# Patient Record
Sex: FEMALE | Race: WHITE | Employment: OTHER | URBAN - METROPOLITAN AREA
[De-identification: names, ages, dates, MRNs, and addresses within clinical notes are randomized per-mention and may not be internally consistent; named-entity substitution may affect disease eponyms.]

---

## 2017-06-29 ENCOUNTER — RADIANT APPOINTMENT (OUTPATIENT)
Dept: GENERAL RADIOLOGY | Facility: CLINIC | Age: 47
End: 2017-06-29
Attending: FAMILY MEDICINE
Payer: COMMERCIAL

## 2017-06-29 ENCOUNTER — OFFICE VISIT (OUTPATIENT)
Dept: FAMILY MEDICINE | Facility: CLINIC | Age: 47
End: 2017-06-29
Payer: COMMERCIAL

## 2017-06-29 VITALS
HEIGHT: 67 IN | WEIGHT: 203.4 LBS | HEART RATE: 68 BPM | DIASTOLIC BLOOD PRESSURE: 78 MMHG | TEMPERATURE: 97 F | BODY MASS INDEX: 31.92 KG/M2 | SYSTOLIC BLOOD PRESSURE: 118 MMHG

## 2017-06-29 DIAGNOSIS — R42 LIGHTHEADEDNESS: ICD-10-CM

## 2017-06-29 DIAGNOSIS — R07.9 CHEST PAIN, UNSPECIFIED TYPE: ICD-10-CM

## 2017-06-29 DIAGNOSIS — R07.9 CHEST PAIN, UNSPECIFIED TYPE: Primary | ICD-10-CM

## 2017-06-29 DIAGNOSIS — H81.10 BPPV (BENIGN PAROXYSMAL POSITIONAL VERTIGO), UNSPECIFIED LATERALITY: ICD-10-CM

## 2017-06-29 LAB
ALBUMIN SERPL-MCNC: 3.7 G/DL (ref 3.4–5)
ALP SERPL-CCNC: 73 U/L (ref 40–150)
ALT SERPL W P-5'-P-CCNC: 29 U/L (ref 0–50)
ANION GAP SERPL CALCULATED.3IONS-SCNC: 4 MMOL/L (ref 3–14)
AST SERPL W P-5'-P-CCNC: 23 U/L (ref 0–45)
BASOPHILS # BLD AUTO: 0 10E9/L (ref 0–0.2)
BASOPHILS NFR BLD AUTO: 0.2 %
BILIRUB SERPL-MCNC: 0.3 MG/DL (ref 0.2–1.3)
BUN SERPL-MCNC: 12 MG/DL (ref 7–30)
CALCIUM SERPL-MCNC: 8.9 MG/DL (ref 8.5–10.1)
CHLORIDE SERPL-SCNC: 104 MMOL/L (ref 94–109)
CO2 SERPL-SCNC: 30 MMOL/L (ref 20–32)
CREAT SERPL-MCNC: 0.88 MG/DL (ref 0.52–1.04)
DIFFERENTIAL METHOD BLD: ABNORMAL
EOSINOPHIL # BLD AUTO: 0.1 10E9/L (ref 0–0.7)
EOSINOPHIL NFR BLD AUTO: 0.7 %
ERYTHROCYTE [DISTWIDTH] IN BLOOD BY AUTOMATED COUNT: 12.9 % (ref 10–15)
GFR SERPL CREATININE-BSD FRML MDRD: 69 ML/MIN/1.7M2
GLUCOSE SERPL-MCNC: 87 MG/DL (ref 70–99)
HCT VFR BLD AUTO: 41.8 % (ref 35–47)
HGB BLD-MCNC: 13.7 G/DL (ref 11.7–15.7)
LYMPHOCYTES # BLD AUTO: 3.4 10E9/L (ref 0.8–5.3)
LYMPHOCYTES NFR BLD AUTO: 30 %
MCH RBC QN AUTO: 31.3 PG (ref 26.5–33)
MCHC RBC AUTO-ENTMCNC: 32.8 G/DL (ref 31.5–36.5)
MCV RBC AUTO: 95 FL (ref 78–100)
MONOCYTES # BLD AUTO: 0.8 10E9/L (ref 0–1.3)
MONOCYTES NFR BLD AUTO: 6.9 %
NEUTROPHILS # BLD AUTO: 7 10E9/L (ref 1.6–8.3)
NEUTROPHILS NFR BLD AUTO: 62.2 %
PLATELET # BLD AUTO: 257 10E9/L (ref 150–450)
POTASSIUM SERPL-SCNC: 3.6 MMOL/L (ref 3.4–5.3)
PROT SERPL-MCNC: 7 G/DL (ref 6.8–8.8)
RBC # BLD AUTO: 4.38 10E12/L (ref 3.8–5.2)
SODIUM SERPL-SCNC: 138 MMOL/L (ref 133–144)
WBC # BLD AUTO: 11.2 10E9/L (ref 4–11)

## 2017-06-29 PROCEDURE — 99214 OFFICE O/P EST MOD 30 MIN: CPT | Performed by: FAMILY MEDICINE

## 2017-06-29 PROCEDURE — 93000 ELECTROCARDIOGRAM COMPLETE: CPT | Performed by: FAMILY MEDICINE

## 2017-06-29 PROCEDURE — 85025 COMPLETE CBC W/AUTO DIFF WBC: CPT | Performed by: FAMILY MEDICINE

## 2017-06-29 PROCEDURE — 36415 COLL VENOUS BLD VENIPUNCTURE: CPT | Performed by: FAMILY MEDICINE

## 2017-06-29 PROCEDURE — 71020 XR CHEST 2 VW: CPT

## 2017-06-29 PROCEDURE — 80053 COMPREHEN METABOLIC PANEL: CPT | Performed by: FAMILY MEDICINE

## 2017-06-29 NOTE — PATIENT INSTRUCTIONS
I suspect the lightheadedness is residual form the vertigo and should be improving over the next week or so.    Your EKG was normal today as was your chest x-ray.  There was no sign of anemia on your blood count.  I will let you know the electrolyte results when available.    I would recommend a basic exercise stress test to help with risk factor stratification.  A normal result would be very reassuring.  You can call [phone] to schedule.    If you develop severe chest pain or pressure please make sure to seek emergent care.

## 2017-06-29 NOTE — NURSING NOTE
"Initial /78  Pulse 68  Temp 97  F (36.1  C) (Tympanic)  Ht 5' 7.25\" (1.708 m)  Wt 203 lb 6.4 oz (92.3 kg)  Breastfeeding? No  BMI 31.62 kg/m2 Estimated body mass index is 31.62 kg/(m^2) as calculated from the following:    Height as of this encounter: 5' 7.25\" (1.708 m).    Weight as of this encounter: 203 lb 6.4 oz (92.3 kg). .      "

## 2017-06-29 NOTE — MR AVS SNAPSHOT
After Visit Summary   6/29/2017    Mariano Dey    MRN: [id number]           Patient Information     Date Of Birth          1970        Visit Information        Provider Department      6/29/2017 3:20 PM Shilpa Guerra DO [facility] [address]        Today's Diagnoses     Chest pain, unspecified type    -  1    Lightheadedness          Care Instructions    I suspect the lightheadedness is residual form the vertigo and should be improving over the next week or so.    Your EKG was normal today as was your chest x-ray.  There was no sign of anemia on your blood count.  I will let you know the electrolyte results when available.    I would recommend a basic exercise stress test to help with risk factor stratification.  A normal result would be very reassuring.  You can call [phone] to schedule.    If you develop severe chest pain or pressure please make sure to seek emergent care.              Follow-ups after your visit        Future tests that were ordered for you today     Open Future Orders        Priority Expected Expires Ordered    Exercise Stress test Routine  8/13/2017 6/29/2017            Who to contact     Normal or non-critical lab and imaging results will be communicated to you by [employer], letter or phone within 4 business days after the clinic has received the results. If you do not hear from us within 7 days, please contact the clinic through [employer] or phone. If you have a critical or abnormal lab result, we will notify you by phone as soon as possible.  Submit refill requests through [email or web address] or call your pharmacy and they will forward the refill request to us. Please allow 3 business days for your refill to be completed.          If you need to speak with a  for additional information , please call: [phone]           Additional Information About Your Visit        [employer] Information     [email or web address] gives you secure access to your electronic health  "record. If you see a primary care provider, you can also send messages to your care team and make appointments. If you have questions, please call your primary care clinic.  If you do not have a primary care provider, please call [phone] and they will assist you.        Care EveryWhere ID     This is your Care EveryWhere ID. This could be used by other organizations to access your [place] medical records  [id number]        Your Vitals Were     Pulse Temperature Height Breastfeeding? BMI (Body Mass Index)       68 97  F (36.1  C) (Tympanic) 5' 7.25\" (1.708 m) No 31.62 kg/m2        Blood Pressure from Last 3 Encounters:   06/29/17 118/78   07/24/15 120/74   10/29/13 122/84    Weight from Last 3 Encounters:   06/29/17 203 lb 6.4 oz (92.3 kg)   08/21/15 209 lb (94.8 kg)   07/31/15 209 lb (94.8 kg)              We Performed the Following     CBC with platelets and differential     Comprehensive metabolic panel     EKG 12-lead complete w/read - Clinics          Today's Medication Changes          These changes are accurate as of: 6/29/17  4:30 PM.  If you have any questions, ask your nurse or doctor.               Stop taking these medicines if you haven't already. Please contact your care team if you have questions.     WOMENS MULTI VITAMIN & MINERAL PO   Stopped by:  Shilpa Guerra DO                    Primary Care Provider Office Phone # Fax #    Shilpa Guerra [phone]       Eads PRAKASH ZARATE [address] [place] [address] DR PRAKASH ZARATE [place] [address]        Equal Access to Services     [facility] campusSHERI : Hadii aad ku hadasho Soomaali, waaxda luqadaha, qaybta kaalmada rose mary draper. So [facility] [phone].    ATENCIÓN: Si habla español, tiene a donovan disposición servicios gratuitos de asistencia lingüística. Llame al [phone].    We comply with applicable federal civil rights laws and [place] laws. We do not discriminate on the basis of race, color, national origin, " age, disability sex, sexual orientation or gender identity.            Thank you!     Thank you for choosing [facility]  for your care. Our goal is always to provide you with excellent care. Hearing back from our patients is one way we can continue to improve our services. Please take a few minutes to complete the written survey that you may receive in the mail after your visit with us. Thank you!             Your Updated Medication List - Protect others around you: Learn how to safely use, store and throw away your medicines at www.disposemymeds.org.          This list is accurate as of: 6/29/17  4:30 PM.  Always use your most recent med list.                   Brand Name Dispense Instructions for use Diagnosis    IBUPROFEN PO      Take 400 mg by mouth as needed.

## 2017-06-29 NOTE — PROGRESS NOTES
"  SUBJECTIVE:                                                    Mariano Dey is a 46 year old female who presents to clinic today for the following health issues:    Dizziness  Onset: 2 months    Description:   Do you feel faint:  YES  Does it feel like the surroundings (bed, room) are moving: YES  Unsteady/off balance: YES  Have you passed out or fallen: no, has almost fallen    Intensity: mild    Progression of Symptoms:  improving and constant    Accompanying Signs & Symptoms:  Heart palpitations: YES  Nausea, vomiting: YES  Weakness in arms or legs: no   Fatigue: YES  Vision or speech changes: no   Ringing in ears (Tinnitus): no   Hearing Loss: no     History:   Head trauma/concussion hx: no   Previous similar symptoms: YES- in her early 20's  Recent bleeding history: no     Precipitating factors:   Worse with activity or head movement: YES  Any new medications (BP?): no   Alcohol/drug abuse/withdrawal: YES- alcohol 2-3 beers daily    Alleviating factors:   Does staying in a fixed position give relief:  YES    Therapies Tried and outcome: Eply maneuver     Symptoms started 6 months ago.  Began with positional vertigo.  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  Was able to pull over and vision cleared in 5 minutes.  Earlier that AM she had some mild chest pressure and \"pinching in the L arm.  No additional episodes of tunnel vision, syncope or presyncope noted.    Feels a couple of times per day her chest feels \"heavy\" which seems to extend towards her neck and towards her L arm.  Sems to last maybe a couple of mintues.  No assoicated SOB or nausea.  Ongoing for about 1 week or so.  Seems to becoming less frequent and less noticeable over time.  Also feels \"off\" mildly lightheaded when she moves her head quickly.  Feels like she has some residual vertigo with head movement.        Pinching sensation is not assosicated with exercise or exertion.  " Feels more tired overall but waking frequently with night sweat issues as well in her perimenopause.  LMP 3-4 months ago, periods overall have bene becoming lighter and further apart over time.  Pt is not sexually active  Has been exercising quite a bit more recently including lifting weights and wondering if this soreness is related to her lifting      Problem list and histories reviewed & adjusted, as indicated.  Additional history: as documented    Reviewed and updated as needed this visit by clinical staff  Tobacco  Allergies  Meds  Med Hx  Surg Hx  Fam Hx  Soc Hx      Reviewed and updated as needed this visit by Provider  Tobacco  Med Hx  Surg Hx  Fam Hx  Soc Hx      ROS: Remainder of Constitutional, CV, Respiratory, GI,  negative with exception of that mentioned above    PE:  VS as above   Gen:  WN/WD/WH female in NAD   HEENT:  NC/AT, conjunctiva wnl, TM's wnl efe pearly gray   with good light reflex, oropharynx clear without    exudate/erythema   Neck:  supple, no LAD appreciated   Heart:  RRR without murmur, nl S1, S2, no rubs or gallops   Lungs CTA efe without rales/ronchi/wheezes   Ext:  No pedal edema    EKG:  NSR with no evidence of ischemia  CXR:  wnl with no acute findings per my visualization    CBC wnl with mild leukocytosis    A/P:      ICD-10-CM    1. Chest pain, unspecified type R07.9 EKG 12-lead complete w/read - Clinics     XR Chest 2 Views     CBC with platelets and differential     Comprehensive metabolic panel     Exercise Stress test   2. Lightheadedness R42 CBC with platelets and differential     Comprehensive metabolic panel   3. BPPV (benign paroxysmal positional vertigo), unspecified laterality H81.10      Patient Instructions   I suspect the lightheadedness is residual form the vertigo and should be improving over the next week or so.    Your EKG was normal today as was your chest x-ray.  There was no sign of anemia on your blood count.  I will let you know the electrolyte  results when available.    I would recommend a basic exercise stress test to help with risk factor stratification.  A normal result would be very reassuring.  You can call [phone] to schedule.    If you develop severe chest pain or pressure please make sure to seek emergent care.

## 2018-08-26 ENCOUNTER — TRANSFERRED RECORDS (OUTPATIENT)
Dept: HEALTH INFORMATION MANAGEMENT | Facility: CLINIC | Age: 48
End: 2018-08-26

## 2018-09-24 ENCOUNTER — HEALTH MAINTENANCE LETTER (OUTPATIENT)
Age: 48
End: 2018-09-24

## 2019-10-24 ENCOUNTER — MYC MEDICAL ADVICE (OUTPATIENT)
Dept: FAMILY MEDICINE | Facility: CLINIC | Age: 49
End: 2019-10-24

## 2019-11-12 ENCOUNTER — OFFICE VISIT (OUTPATIENT)
Dept: FAMILY MEDICINE | Facility: CLINIC | Age: 49
End: 2019-11-12
Payer: COMMERCIAL

## 2019-11-12 VITALS
DIASTOLIC BLOOD PRESSURE: 66 MMHG | BODY MASS INDEX: 32.83 KG/M2 | HEART RATE: 80 BPM | HEIGHT: 67 IN | WEIGHT: 209.2 LBS | SYSTOLIC BLOOD PRESSURE: 124 MMHG | RESPIRATION RATE: 12 BRPM | TEMPERATURE: 97 F

## 2019-11-12 DIAGNOSIS — Z72.0 TOBACCO ABUSE: ICD-10-CM

## 2019-11-12 DIAGNOSIS — Z12.11 SPECIAL SCREENING FOR MALIGNANT NEOPLASMS, COLON: ICD-10-CM

## 2019-11-12 DIAGNOSIS — R22.31 MASS OF RIGHT AXILLA: Primary | ICD-10-CM

## 2019-11-12 DIAGNOSIS — Z12.31 ENCOUNTER FOR SCREENING MAMMOGRAM FOR BREAST CANCER: ICD-10-CM

## 2019-11-12 PROCEDURE — 99213 OFFICE O/P EST LOW 20 MIN: CPT | Performed by: FAMILY MEDICINE

## 2019-11-12 ASSESSMENT — MIFFLIN-ST. JEOR: SCORE: 1610.51

## 2019-11-12 NOTE — NURSING NOTE
"Initial /66   Pulse 80   Temp 97  F (36.1  C) (Tympanic)   Resp 12   Ht 1.708 m (5' 7.25\")   Wt 94.9 kg (209 lb 3.2 oz)   Breastfeeding? No   BMI 32.52 kg/m   Estimated body mass index is 32.52 kg/m  as calculated from the following:    Height as of this encounter: 1.708 m (5' 7.25\").    Weight as of this encounter: 94.9 kg (209 lb 3.2 oz). .      "

## 2019-11-12 NOTE — PATIENT INSTRUCTIONS
Please call [phone] to schedule your mammogram    [facility] online for an order form.  Complete your information and send it to me to sign.  You should get your colon cancer screening test in the mail.  I will let you know results when I see them    We'll try the Chantix again.  If you have issues with nausea again we can discuss other options at your physical.  Please plan on fasting for that visit.  You will need to be fasting for 12 hours (nothing but water) prior to your blood work.

## 2019-11-12 NOTE — PROGRESS NOTES
"Subjective     Mariano Dey is a 48 year old female who presents to clinic today for the following health issues:    HPI     * lump right axilla, noticed 2 weeks ago    Incidentally noticed a small nontender superficial lump in the R axilla 2 weeks ago.  When she initially came for her appointment had already resolved.  Has not been able to feel it since.  No other breast concerns.  No lumps or masses, no skin changes, no nipple discharge    Thought she would keep appt as she knows she is due for mammogram screening at 49.    *  Interested in tob cessation.  Generally smokes 1ppd and has for many years.  Tried chantix years ago.  Did have some nausea but otherwise tolerated it well.  Would like to give it another shot.  Knows that tob cessation is the only way to decrease her lung cancer risk      Reviewed and updated as needed this visit by Provider  Tobacco  Allergies  Meds  Med Hx  Surg Hx  Fam Hx  Soc Hx        Review of Systems   ROS COMP: Constitutional, HEENT, cardiovascular, pulmonary, gi and gu systems are negative, except as otherwise noted.      Objective    /66   Pulse 80   Temp 97  F (36.1  C) (Tympanic)   Resp 12   Ht 1.708 m (5' 7.25\")   Wt 94.9 kg (209 lb 3.2 oz)   Breastfeeding? No   BMI 32.52 kg/m    Body mass index is 32.52 kg/m .  Physical Exam   PE:  VS as above   Gen:  WN/WD/WH female in NAD   Breasts :  symmetric.  No dominant, discrete, fixed  or suspicious masses are noted.  Mild symmetric fibrocystic densities are noted in both upper outer quadrants. No skin or nipple changes or axillary nodes.    Psych: Alert and oriented times 3; coherent speech, normal   rate and volume, able to articulate logical thoughts, able   to abstract reason, no tangential thoughts, no hallucinations   or delusions  Her affect is bright and appropriate        Diagnostic Test Results:  none         A/p:      ICD-10-CM    1. Mass of right axilla R22.31    2. Encounter for screening mammogram " for breast cancer Z12.31 MA Screening Digital Bilateral   3. Special screening for malignant neoplasms, colon Z12.11    4. Tobacco abuse Z72.0 varenicline (CHANTIX NEVAEH) 0.5 MG X 11 & 1 MG X 42 tablet     Patient Instructions   Please call [phone] to schedule your mammogram    Please contact [employer] online for an order form.  Complete your information and send it to me to sign.  You should get your colon cancer screening test in the mail.  I will let you know results when I see them    We'll try the Chantix again.  If you have issues with nausea again we can discuss other options at your physical.  Please plan on fasting for that visit.  You will need to be fasting for 12 hours (nothing but water) prior to your blood work.          Reviewed normal exam.  Advised screening mammogram.    Reviewed colon screening.  Pt low risk with no family h/o colon CA.  Opted for Cologuard as above    Reviewed chantix, script sent    Pt due for CPE, plans to schedule this spring.

## 2019-11-20 ENCOUNTER — MYC MEDICAL ADVICE (OUTPATIENT)
Dept: FAMILY MEDICINE | Facility: CLINIC | Age: 49
End: 2019-11-20

## 2019-11-21 ENCOUNTER — HOSPITAL ENCOUNTER (OUTPATIENT)
Dept: MAMMOGRAPHY | Facility: CLINIC | Age: 49
Discharge: HOME | End: 2019-11-21
Attending: FAMILY MEDICINE | Admitting: FAMILY MEDICINE
Payer: COMMERCIAL

## 2019-11-21 DIAGNOSIS — Z12.31 ENCOUNTER FOR SCREENING MAMMOGRAM FOR BREAST CANCER: ICD-10-CM

## 2019-11-21 PROCEDURE — 77063 BREAST TOMOSYNTHESIS BI: CPT

## 2019-12-11 ENCOUNTER — MYC REFILL (OUTPATIENT)
Dept: FAMILY MEDICINE | Facility: CLINIC | Age: 49
End: 2019-12-11

## 2019-12-11 DIAGNOSIS — Z72.0 TOBACCO ABUSE: ICD-10-CM

## 2019-12-12 NOTE — TELEPHONE ENCOUNTER
"[employer] message sent to pt to see how she is doing on the Chantix and to confirn her current dose.    Requested Prescriptions   Pending Prescriptions Disp Refills     varenicline (CHANTIX NEVAEH) 0.5 MG X 11 & 1 MG X 42 tablet 53 tablet 0     Sig: Take 0.5 mg tab daily for 3 days, THEN 0.5 mg tab twice daily for 4 days, THEN 1 mg twice daily.       Partial Cholinergic Nicotinic Agonist Agents Passed - 12/11/2019  5:29 PM        Passed - Blood pressure under 140/90 in past 12 months     BP Readings from Last 3 Encounters:   11/12/19 124/66   06/29/17 118/78   07/24/15 120/74                 Passed - Recent (12 mo) or future (30 days) visit within the authorizing provider's specialty     Patient has had an office visit with the authorizing provider or a provider within the authorizing providers department within the previous 12 mos or has a future within next 30 days. See \"Patient Info\" tab in inbasket, or \"Choose Columns\" in Meds & Orders section of the refill encounter.              Passed - Medication is active on med list        Passed - Patient is 18 years of age or older        Passed - Patient is not pregnant        Passed - No positive pregnancy test on file in past 12 months        Saritha SCHMIDT RN      "

## 2019-12-17 RX ORDER — VARENICLINE TARTRATE 1 MG/1
1 TABLET, FILM COATED ORAL 2 TIMES DAILY
Qty: 60 TABLET | Refills: 0 | Status: SHIPPED | OUTPATIENT
Start: 2019-12-17 | End: 2021-02-05

## 2019-12-17 NOTE — TELEPHONE ENCOUNTER
Lisa response from Patient regarding Chantix:    I found that the 1mg twice a day made the nausea a problem...so, taking 1 mg each morning and that keeps it at bay.     Please advise on additional refills/dosing instructions    Mackenzie Corbett RN

## 2020-01-21 ENCOUNTER — MYC MEDICAL ADVICE (OUTPATIENT)
Dept: FAMILY MEDICINE | Facility: CLINIC | Age: 50
End: 2020-01-21

## 2020-01-21 NOTE — TELEPHONE ENCOUNTER
Please advise if you would like patient contact coluard or complete the form and refax  (attachment 11/20/19 [employer] message)     Mackenzie Corbett RN

## 2020-02-07 ENCOUNTER — TRANSFERRED RECORDS (OUTPATIENT)
Dept: HEALTH INFORMATION MANAGEMENT | Facility: CLINIC | Age: 50
End: 2020-02-07

## 2020-02-07 LAB — COLOGUARD-ABSTRACT: NEGATIVE

## 2020-02-14 ENCOUNTER — TELEPHONE (OUTPATIENT)
Dept: FAMILY MEDICINE | Facility: CLINIC | Age: 50
End: 2020-02-14

## 2020-02-14 NOTE — TELEPHONE ENCOUNTER
Please call pt and let her know her Cologuard test was negative/normal.  This should be repeated in 3 years.    Shilpa Guerra, DO

## 2020-02-24 ENCOUNTER — HEALTH MAINTENANCE LETTER (OUTPATIENT)
Age: 50
End: 2020-02-24

## 2020-10-20 ENCOUNTER — HOSPITAL ENCOUNTER (EMERGENCY)
Facility: CLINIC | Age: 50
Discharge: HOME | End: 2020-10-20
Attending: NURSE PRACTITIONER | Admitting: NURSE PRACTITIONER
Payer: COMMERCIAL

## 2020-10-20 ENCOUNTER — APPOINTMENT (OUTPATIENT)
Dept: GENERAL RADIOLOGY | Facility: CLINIC | Age: 50
End: 2020-10-20
Attending: NURSE PRACTITIONER
Payer: COMMERCIAL

## 2020-10-20 VITALS
HEIGHT: 68 IN | DIASTOLIC BLOOD PRESSURE: 87 MMHG | OXYGEN SATURATION: 99 % | BODY MASS INDEX: 30.31 KG/M2 | SYSTOLIC BLOOD PRESSURE: 139 MMHG | RESPIRATION RATE: 18 BRPM | HEART RATE: 80 BPM | WEIGHT: 200 LBS | TEMPERATURE: 97.8 F

## 2020-10-20 DIAGNOSIS — M10.9 ACUTE GOUTY ARTHRITIS: ICD-10-CM

## 2020-10-20 LAB — URATE SERPL-MCNC: 4.3 MG/DL (ref 2.6–6)

## 2020-10-20 PROCEDURE — 84550 ASSAY OF BLOOD/URIC ACID: CPT | Performed by: NURSE PRACTITIONER

## 2020-10-20 PROCEDURE — 36415 COLL VENOUS BLD VENIPUNCTURE: CPT | Performed by: NURSE PRACTITIONER

## 2020-10-20 PROCEDURE — 99214 OFFICE O/P EST MOD 30 MIN: CPT | Performed by: NURSE PRACTITIONER

## 2020-10-20 PROCEDURE — G0463 HOSPITAL OUTPT CLINIC VISIT: HCPCS | Performed by: NURSE PRACTITIONER

## 2020-10-20 PROCEDURE — 73630 X-RAY EXAM OF FOOT: CPT | Mod: RT

## 2020-10-20 RX ORDER — PREDNISONE 20 MG/1
TABLET ORAL
Qty: 10 TABLET | Refills: 0 | Status: SHIPPED | OUTPATIENT
Start: 2020-10-20 | End: 2021-02-05

## 2020-10-20 RX ORDER — OXYCODONE HYDROCHLORIDE 5 MG/1
5 TABLET ORAL EVERY 6 HOURS PRN
Qty: 6 TABLET | Refills: 0 | Status: SHIPPED | OUTPATIENT
Start: 2020-10-20 | End: 2021-02-05

## 2020-10-20 ASSESSMENT — ENCOUNTER SYMPTOMS
CHILLS: 0
COUGH: 0
NUMBNESS: 0
FEVER: 0
SHORTNESS OF BREATH: 0

## 2020-10-20 ASSESSMENT — MIFFLIN-ST. JEOR: SCORE: 1575.69

## 2020-10-20 NOTE — ED AVS SNAPSHOT
[facility] Emergency Dept  [address] [address]  Phone: [phone]  Fax: [phone]                                    Mariano Dey   MRN: [id number]    Department: [facility] Emergency Dept   Date of Visit: 10/20/2020           After Visit Summary Signature Page    I have received my discharge instructions, and my questions have been answered. I have discussed any challenges I see with this plan with the nurse or doctor.    ..........................................................................................................................................  Patient/Patient Representative Signature      ..........................................................................................................................................  Patient Representative Print Name and Relationship to Patient    ..................................................               ................................................  Date                                   Time    ..........................................................................................................................................  Reviewed by Signature/Title    ...................................................              ..............................................  Date                                               Time          22EPIC Rev 08/18

## 2020-10-21 NOTE — ED PROVIDER NOTES
History     Chief Complaint   Patient presents with     Foot Pain     injured it on stairs this morning, was able to bear weight now pain worse, right great toe     HPI  Mariano Dey is a 49 year old female with history of rheumatoid arthritis who states she injured her right great toe on the stairs this morning but only had mild pain. This afternoon she started to have sharp shooting pains in her great toe joint. Hard to bear weight due to the pain. Remainder of the foot and ankle is not painful. States she has history of arthritis in the toe.    Allergies:  Allergies   Allergen Reactions     Nkda [No Known Drug Allergies]        Problem List:    Patient Active Problem List    Diagnosis Date Noted     Cholelithiasis 06/13/2013     Priority: Medium     Problem list name updated by automated process. Provider to review and confirm       Pancreatitis 05/18/2013     Priority: Medium     Migraines      Priority: Medium     Patient given Migraine Education folder and Migraine Action Plan on May 10, 2011.       RA (rheumatoid arthritis) (H)      Priority: Medium     CARDIOVASCULAR SCREENING; LDL GOAL LESS THAN 160 10/31/2010     Priority: Medium        Past Medical History:    Past Medical History:   Diagnosis Date     HPV (human papillomavirus)      Migraines      Pancreatitis      RA (rheumatoid arthritis) (H)        Past Surgical History:    Past Surgical History:   Procedure Laterality Date     CHOLECYSTECTOMY  6/13/13     LAPAROSCOPIC CHOLECYSTECTOMY  6/13/2013    Procedure: LAPAROSCOPIC CHOLECYSTECTOMY;  Laparoscopic Cholecystetomy;  Surgeon: Joshua Quiñonez MD;  Location: [facility] OR       Family History:    Family History   Problem Relation Age of Onset     Cancer Mother      Cancer Father      Breast Cancer Maternal Grandmother      Cardiovascular Maternal Grandmother      C.A.D. Maternal Grandfather        Social History:  Marital Status:   [2]  Social History     Tobacco Use     Smoking status: Current  "Every Day Smoker     Packs/day: 0.25     Types: Cigarettes     Last attempt to quit: 2009     Years since quittin.7     Smokeless tobacco: Never Used   Substance Use Topics     Alcohol use: Yes     Alcohol/week: 0.0 standard drinks     Comment: 2-3 beers daily     Drug use: No        Medications:         oxyCODONE (ROXICODONE) 5 MG tablet       predniSONE (DELTASONE) 20 MG tablet       IBUPROFEN PO       varenicline (CHANTIX NEVAEH) 0.5 MG X 11 & 1 MG X 42 tablet       varenicline (CHANTIX) 1 MG tablet          Review of Systems   Constitutional: Negative for chills and fever.   Respiratory: Negative for cough and shortness of breath.    Musculoskeletal:        Right great toe pain.   Neurological: Negative for numbness.       Physical Exam   BP: 139/87  Pulse: 80  Temp: 97.8  F (36.6  C)  Resp: 18  Height: 172.7 cm (5' 8\")  Weight: 90.7 kg (200 lb)  SpO2: 99 %      Physical Exam  Constitutional:       Appearance: Normal appearance.      Comments: Appears distressed, pain out of proportion   Cardiovascular:      Rate and Rhythm: Normal rate.   Pulmonary:      Effort: Pulmonary effort is normal.   Musculoskeletal:      Comments: Right foot- first MTP joint tenderness with minimal palpation. Increased warmth and faint erythema to the first MTP joint.   Neurological:      General: No focal deficit present.      Mental Status: She is alert and oriented to person, place, and time.         ED Course        Procedures               Results for orders placed or performed during the hospital encounter of 10/20/20 (from the past 24 hour(s))   Foot  XR, G/E 3 views, right    Narrative    EXAM: XR FOOT RT G/E 3 VW  LOCATION: [facility]  DATE/TIME: 10/20/2020 7:12 PM    INDICATION: Right foot pain.  COMPARISON: None.      Impression    IMPRESSION: No fracture or dislocation. Moderate degenerative changes at the first MTP joint. Small accessory navicular.   Uric acid   Result Value Ref Range    Uric Acid 4.3 " 2.6 - 6.0 mg/dL     Clinical diagnostic rule/scoring for gout:  -Male (2pts)-no  -Onset within 1 day (0.5pts)-yes  -Previous reported arthritis attack (2pts)-no  -Joint redness (1pt)-yes  -First metatarsal joint (2.5pts)-yes  -Htn (1pt)-no  -Uric Acid >5.88 (3.5pts)-no  Score: 4.0  (<4 consider alternate dx, >4 and <8 probability of gout intermediate, >8 probability of gout is high)      Medications - No data to display    Assessments & Plan (with Medical Decision Making)     Exam is concerning for gouty arthritis, and given her pain out of proportion.  He does not have a history of gout, but does report history of arthritis to her great toe.  X-rays negative for acute osseous abnormality.  Uric acid is 4.0.  I discussed the lab and imaging findings with patient.  Although her uric acid is not significantly elevated I still have concern for bony arthritis.  Patient will be treated with a course of prednisone.  Patient also provided a small number of oxycodone.  Instructed to recheck for persistent or worsening symptoms.      I have reviewed the nursing notes.    I have reviewed the findings, diagnosis, plan and need for follow up with the patient.      Discharge Medication List as of 10/20/2020  7:56 PM      START taking these medications    Details   oxyCODONE (ROXICODONE) 5 MG tablet Take 1 tablet (5 mg) by mouth every 6 hours as needed for moderate to severe pain, Disp-6 tablet, R-0, Local Print      predniSONE (DELTASONE) 20 MG tablet Take two tablets (= 40mg) each day for 5 (five) days, Disp-10 tablet, R-0, E-Prescribe             Final diagnoses:   Acute gouty arthritis       10/20/2020   [facility] EMERGENCY DEPT     Mary Ann, MONICA Cooper CNP  10/20/20 2010

## 2020-10-21 NOTE — DISCHARGE INSTRUCTIONS
Prednisone 40 mg daily for 5 days.  Tylenol (Acetaminophen) 650 mg every 4-6 hours as needed for mild/moderate pain.  Oxycodone 5 mg every 6 hours if needed for moderate/severe pain. Do not use alcohol, operate machinery, drive, or climb on ladders for 8 hours after taking Oxycodone.   Recheck for worsening symptoms.

## 2020-12-13 ENCOUNTER — HEALTH MAINTENANCE LETTER (OUTPATIENT)
Age: 50
End: 2020-12-13

## 2021-02-04 NOTE — PROGRESS NOTES
Assessment & Plan     Upper abdominal pain  Etiology unclear.  Labs today:  - CBC with platelets  - Lipase  - Amylase  - Comprehensive metabolic panel (BMP + Alb, Alk Phos, ALT, AST, Total. Bili, TP)  If labs abnormal, will order CT  If labs normal, will consider EGD.    Discussed lifestyle habits that could be contributing - advised to cut back on the amount of coffee and alcohol she is consuming.  Encourage smoking cessation.    Rheumatoid arthritis involving multiple sites, unspecified whether rheumatoid factor present (H)  - Rheumatology Referral; Future                    Return in about 1 week (around 2/12/2021), or if symptoms worsen or fail to improve.    The risks, benefits and treatment options of prescribed medications or other treatments have been discussed with the patient. The patient verbalized their understanding and should call or follow up if no improvement or if they develop further problems.    MONICA Garcia CNP  [facility] ELLIOT Quintana is a 50 year old who presents to clinic today for the following health issues     HPI       Abdominal/Flank Pain  Onset/Duration:  One week - woke her up in the middle of the night on Thursday  Description:   Character: Sharp for a few days  Hurt to take a deep breath  No longer sharp;  Dull - constant pressure  Location: epigastric region  Radiation: Back  Intensity: mild today  Progression of Symptoms:  Improving every day  Accompanying Signs & Symptoms:  Fever/Chills: Yes- chills;  Didn't check temp  Gas/Bloating: no  Nausea: YES - has been eating normally the last 2 days.  Vomitting: YES - only the first night, nothing since  Diarrhea: no  Constipation: no  Dysuria or Hematuria: no  No melena, hematochezia or hematemesis.  History:   Trauma: no  Previous similar pain: YES-  When she had gallstones and pancreatitis, s/p julia 2013; since then has intermittent episodes, last was 3 months ago, usual lasts a  "day  Previous tests done: none  Precipitating factors:   Does the pain change with:     Food: didn't eat for 4-5 days so not sure    Bowel Movement: no    Urination: no   Other factors:  no  Therapies tried and outcome: tums- no relief  Patient's last menstrual period was 07/16/2015.    Drinks a pot of coffee daily  Smoker  Drinks 6-7 beers daily.      Also has RA and needs to establish care with a rheumatologist.          Review of Systems   Constitutional, HEENT, cardiovascular, pulmonary, gi and gu systems are negative, except as otherwise noted.      Objective    /76 (BP Location: Right arm, Cuff Size: Adult Large)   Pulse 80   Temp 98  F (36.7  C)   Resp 12   Ht 1.727 m (5' 8\")   Wt 94.3 kg (208 lb)   LMP 07/16/2015   SpO2 99%   BMI 31.63 kg/m    Body mass index is 31.63 kg/m .  Physical Exam   GENERAL: healthy, alert and no distress  HENT: normal cephalic/atraumatic and ear canals and TM's normal  NECK: no adenopathy, no asymmetry, masses, or scars and thyroid normal to palpation  RESP: lungs clear to auscultation - no rales, rhonchi or wheezes  CV: regular rate and rhythm, normal S1 S2, no S3 or S4, no murmur, click or rub, no peripheral edema and peripheral pulses strong  ABDOMEN: soft, nondistended, without hepatosplenomegaly or masses and bowel sounds normal. Tender epigastrum                "

## 2021-02-05 ENCOUNTER — OFFICE VISIT (OUTPATIENT)
Dept: FAMILY MEDICINE | Facility: CLINIC | Age: 51
End: 2021-02-05
Payer: COMMERCIAL

## 2021-02-05 VITALS
BODY MASS INDEX: 31.52 KG/M2 | RESPIRATION RATE: 12 BRPM | SYSTOLIC BLOOD PRESSURE: 138 MMHG | HEART RATE: 80 BPM | WEIGHT: 208 LBS | DIASTOLIC BLOOD PRESSURE: 76 MMHG | OXYGEN SATURATION: 99 % | TEMPERATURE: 98 F | HEIGHT: 68 IN

## 2021-02-05 DIAGNOSIS — M06.9 RHEUMATOID ARTHRITIS INVOLVING MULTIPLE SITES, UNSPECIFIED WHETHER RHEUMATOID FACTOR PRESENT (H): ICD-10-CM

## 2021-02-05 DIAGNOSIS — R10.10 UPPER ABDOMINAL PAIN: Primary | ICD-10-CM

## 2021-02-05 LAB
ALBUMIN SERPL-MCNC: 3.8 G/DL (ref 3.4–5)
ALP SERPL-CCNC: 99 U/L (ref 40–150)
ALT SERPL W P-5'-P-CCNC: 35 U/L (ref 0–50)
AMYLASE SERPL-CCNC: 78 U/L (ref 30–110)
ANION GAP SERPL CALCULATED.3IONS-SCNC: 4 MMOL/L (ref 3–14)
AST SERPL W P-5'-P-CCNC: 22 U/L (ref 0–45)
BILIRUB SERPL-MCNC: 0.3 MG/DL (ref 0.2–1.3)
BUN SERPL-MCNC: 8 MG/DL (ref 7–30)
CALCIUM SERPL-MCNC: 9 MG/DL (ref 8.5–10.1)
CHLORIDE SERPL-SCNC: 106 MMOL/L (ref 94–109)
CO2 SERPL-SCNC: 30 MMOL/L (ref 20–32)
CREAT SERPL-MCNC: 0.88 MG/DL (ref 0.52–1.04)
ERYTHROCYTE [DISTWIDTH] IN BLOOD BY AUTOMATED COUNT: 12.4 % (ref 10–15)
GFR SERPL CREATININE-BSD FRML MDRD: 76 ML/MIN/{1.73_M2}
GLUCOSE SERPL-MCNC: 146 MG/DL (ref 70–99)
HCT VFR BLD AUTO: 42.8 % (ref 35–47)
HGB BLD-MCNC: 14.3 G/DL (ref 11.7–15.7)
LIPASE SERPL-CCNC: 254 U/L (ref 73–393)
MCH RBC QN AUTO: 31.6 PG (ref 26.5–33)
MCHC RBC AUTO-ENTMCNC: 33.4 G/DL (ref 31.5–36.5)
MCV RBC AUTO: 95 FL (ref 78–100)
PLATELET # BLD AUTO: 287 10E9/L (ref 150–450)
POTASSIUM SERPL-SCNC: 4.1 MMOL/L (ref 3.4–5.3)
PROT SERPL-MCNC: 7.6 G/DL (ref 6.8–8.8)
RBC # BLD AUTO: 4.52 10E12/L (ref 3.8–5.2)
SODIUM SERPL-SCNC: 140 MMOL/L (ref 133–144)
WBC # BLD AUTO: 8.4 10E9/L (ref 4–11)

## 2021-02-05 PROCEDURE — 36415 COLL VENOUS BLD VENIPUNCTURE: CPT | Performed by: NURSE PRACTITIONER

## 2021-02-05 PROCEDURE — 80053 COMPREHEN METABOLIC PANEL: CPT | Performed by: NURSE PRACTITIONER

## 2021-02-05 PROCEDURE — 99214 OFFICE O/P EST MOD 30 MIN: CPT | Performed by: NURSE PRACTITIONER

## 2021-02-05 PROCEDURE — 82150 ASSAY OF AMYLASE: CPT | Performed by: NURSE PRACTITIONER

## 2021-02-05 PROCEDURE — 83690 ASSAY OF LIPASE: CPT | Performed by: NURSE PRACTITIONER

## 2021-02-05 PROCEDURE — 85027 COMPLETE CBC AUTOMATED: CPT | Performed by: NURSE PRACTITIONER

## 2021-02-05 ASSESSMENT — MIFFLIN-ST. JEOR: SCORE: 1606.98

## 2021-02-05 NOTE — PATIENT INSTRUCTIONS
You are due for a screening Mammogram.  Please contact the Diagnostics Registration Department at: [phone] to schedule this appointment.  Your clinic record indicates that you are due for:   Pap and physical exam and Immunization(s) Td  Q 10 years        Thank you for choosing [facility].  You may be receiving an email and/or telephone survey request from [facility] Customer Experience regarding your visit today.  Please take a few minutes to respond to the survey to let us know how we are doing.      If you have questions or concerns, please contact us via [employer] or you can contact your care team at [phone].    Our Clinic hours are:  Monday 6:40 am  to 7:00 pm  Tuesday -Friday 6:40 am to 5:00 pm    The [place] outpatient lab hours are:  Monday - Friday 6:10 am to 4:45 pm  Saturdays 7:00 am to 11:00 am  Appointments are required, call [phone]    If you have clinical questions after hours or would like to schedule an appointment,  call the clinic at [phone].

## 2021-02-08 DIAGNOSIS — R10.13 EPIGASTRIC PAIN: Primary | ICD-10-CM

## 2021-02-09 ENCOUNTER — DOCUMENTATION ONLY (OUTPATIENT)
Dept: CARE COORDINATION | Facility: CLINIC | Age: 51
End: 2021-02-09

## 2021-02-09 DIAGNOSIS — Z11.59 ENCOUNTER FOR SCREENING FOR OTHER VIRAL DISEASES: ICD-10-CM

## 2021-02-17 DIAGNOSIS — Z11.59 ENCOUNTER FOR SCREENING FOR OTHER VIRAL DISEASES: ICD-10-CM

## 2021-02-17 LAB
SARS-COV-2 RNA RESP QL NAA+PROBE: NORMAL
SPECIMEN SOURCE: NORMAL

## 2021-02-17 PROCEDURE — 87635 SARS-COV-2 COVID-19 AMP PRB: CPT | Performed by: SURGERY

## 2021-02-18 ENCOUNTER — ANESTHESIA EVENT (OUTPATIENT)
Dept: GASTROENTEROLOGY | Facility: CLINIC | Age: 51
End: 2021-02-18
Payer: COMMERCIAL

## 2021-02-18 LAB
LABORATORY COMMENT REPORT: NORMAL
SARS-COV-2 RNA RESP QL NAA+PROBE: NEGATIVE
SPECIMEN SOURCE: NORMAL

## 2021-02-18 ASSESSMENT — LIFESTYLE VARIABLES: TOBACCO_USE: 1

## 2021-02-18 NOTE — ANESTHESIA PREPROCEDURE EVALUATION
Anesthesia Pre-Procedure Evaluation    Patient: Mariano Dey   MRN: [id number] : 1970        Preoperative Diagnosis: Epigastric pain [R10.13]   Procedure : Procedure(s):  ESOPHAGOGASTRODUODENOSCOPY (EGD)     Past Medical History:   Diagnosis Date     HPV (human papillomavirus)      Migraines      Pancreatitis      RA (rheumatoid arthritis) (H)       Past Surgical History:   Procedure Laterality Date     CHOLECYSTECTOMY  13     LAPAROSCOPIC CHOLECYSTECTOMY  2013    Procedure: LAPAROSCOPIC CHOLECYSTECTOMY;  Laparoscopic Cholecystetomy;  Surgeon: Joshua Quiñonez MD;  Location: [facility] OR      Allergies   Allergen Reactions     Nkda [No Known Drug Allergies]       Social History     Tobacco Use     Smoking status: Current Every Day Smoker     Packs/day: 1.00     Years: 30.00     Pack years: 30.00     Types: Cigarettes     Last attempt to quit: 2009     Years since quittin.0     Smokeless tobacco: Never Used   Substance Use Topics     Alcohol use: Yes     Alcohol/week: 0.0 standard drinks     Comment: 2-3 beers daily      Wt Readings from Last 1 Encounters:   21 94.3 kg (208 lb)        Anesthesia Evaluation   Pt has had prior anesthetic. Type: MAC and General.    No history of anesthetic complications       ROS/MED HX  ENT/Pulmonary:     (+) tobacco use, Current use,     Neurologic:     (+) migraines,     Cardiovascular:  - neg cardiovascular ROS     METS/Exercise Tolerance:     Hematologic:  - neg hematologic  ROS     Musculoskeletal: Comment: rheumatoid  (+) arthritis,     GI/Hepatic: Comment: epigastric pain          Renal/Genitourinary:  - neg Renal ROS     Endo:  - neg endo ROS     Psychiatric/Substance Use:  - neg psychiatric ROS     Infectious Disease:  - neg infectious disease ROS     Malignancy:  - neg malignancy ROS     Other:  - neg other ROS          Physical Exam    Airway  airway exam normal      Mallampati: I   TM distance: > 3 FB   Neck ROM: full   Mouth opening: > 3  cm    Respiratory Devices and Support         Dental  no notable dental history         Cardiovascular   cardiovascular exam normal          Pulmonary   pulmonary exam normal                OUTSIDE LABS:  CBC:   Lab Results   Component Value Date    WBC 8.4 02/05/2021    WBC 11.2 (H) 06/29/2017    HGB 14.3 02/05/2021    HGB 13.7 06/29/2017    HCT 42.8 02/05/2021    HCT 41.8 06/29/2017     02/05/2021     06/29/2017     BMP:   Lab Results   Component Value Date     02/05/2021     06/29/2017    POTASSIUM 4.1 02/05/2021    POTASSIUM 3.6 06/29/2017    CHLORIDE 106 02/05/2021    CHLORIDE 104 06/29/2017    CO2 30 02/05/2021    CO2 30 06/29/2017    BUN 8 02/05/2021    BUN 12 06/29/2017    CR 0.88 02/05/2021    CR 0.88 06/29/2017     (H) 02/05/2021    GLC 87 06/29/2017     COAGS:   Lab Results   Component Value Date    INR 0.95 05/18/2013     POC: No results found for: BGM, HCG, HCGS  HEPATIC:   Lab Results   Component Value Date    ALBUMIN 3.8 02/05/2021    PROTTOTAL 7.6 02/05/2021    ALT 35 02/05/2021    AST 22 02/05/2021    GGT 24 10/29/2013    ALKPHOS 99 02/05/2021    BILITOTAL 0.3 02/05/2021     OTHER:   Lab Results   Component Value Date    LACT 1.0 05/18/2013    NORRIS 9.0 02/05/2021    MAG 2.0 05/20/2013    LIPASE 254 02/05/2021    AMYLASE 78 02/05/2021       Anesthesia Plan    ASA Status:  2   NPO Status:  NPO Appropriate    Anesthesia Type: MAC.      Maintenance: Balanced.        Consents    Anesthesia Plan(s) and associated risks, benefits, and realistic alternatives discussed. Questions answered and patient/representative(s) expressed understanding.     - Discussed with:  Patient         Postoperative Care            Comments:                Guy Pearl CRNA, APRN CRNA

## 2021-02-19 ENCOUNTER — ANESTHESIA (OUTPATIENT)
Dept: GASTROENTEROLOGY | Facility: CLINIC | Age: 51
End: 2021-02-19
Payer: COMMERCIAL

## 2021-02-19 ENCOUNTER — HOSPITAL ENCOUNTER (OUTPATIENT)
Facility: CLINIC | Age: 51
Discharge: HOME | End: 2021-02-19
Attending: SURGERY | Admitting: SURGERY
Payer: COMMERCIAL

## 2021-02-19 VITALS
WEIGHT: 202 LBS | DIASTOLIC BLOOD PRESSURE: 87 MMHG | BODY MASS INDEX: 30.62 KG/M2 | RESPIRATION RATE: 18 BRPM | TEMPERATURE: 99 F | OXYGEN SATURATION: 97 % | HEART RATE: 61 BPM | HEIGHT: 68 IN | SYSTOLIC BLOOD PRESSURE: 136 MMHG

## 2021-02-19 LAB — UPPER GI ENDOSCOPY: NORMAL

## 2021-02-19 PROCEDURE — 250N000009 HC RX 250: Performed by: SURGERY

## 2021-02-19 PROCEDURE — 43239 EGD BIOPSY SINGLE/MULTIPLE: CPT | Performed by: SURGERY

## 2021-02-19 PROCEDURE — 88305 TISSUE EXAM BY PATHOLOGIST: CPT | Mod: 26 | Performed by: PATHOLOGY

## 2021-02-19 PROCEDURE — 258N000003 HC RX IP 258 OP 636: Performed by: SURGERY

## 2021-02-19 PROCEDURE — 370N000017 HC ANESTHESIA TECHNICAL FEE, PER MIN: Performed by: SURGERY

## 2021-02-19 PROCEDURE — 250N000009 HC RX 250: Performed by: NURSE ANESTHETIST, CERTIFIED REGISTERED

## 2021-02-19 PROCEDURE — 88305 TISSUE EXAM BY PATHOLOGIST: CPT | Mod: TC | Performed by: SURGERY

## 2021-02-19 PROCEDURE — 250N000011 HC RX IP 250 OP 636: Performed by: NURSE ANESTHETIST, CERTIFIED REGISTERED

## 2021-02-19 RX ORDER — PROPOFOL 10 MG/ML
INJECTION, EMULSION INTRAVENOUS CONTINUOUS PRN
Status: DISCONTINUED | OUTPATIENT
Start: 2021-02-19 | End: 2021-02-19

## 2021-02-19 RX ORDER — LIDOCAINE HYDROCHLORIDE 10 MG/ML
INJECTION, SOLUTION INFILTRATION; PERINEURAL PRN
Status: DISCONTINUED | OUTPATIENT
Start: 2021-02-19 | End: 2021-02-19

## 2021-02-19 RX ORDER — ONDANSETRON 2 MG/ML
4 INJECTION INTRAMUSCULAR; INTRAVENOUS
Status: DISCONTINUED | OUTPATIENT
Start: 2021-02-19 | End: 2021-02-19 | Stop reason: HOSPADM

## 2021-02-19 RX ORDER — PROPOFOL 10 MG/ML
INJECTION, EMULSION INTRAVENOUS PRN
Status: DISCONTINUED | OUTPATIENT
Start: 2021-02-19 | End: 2021-02-19

## 2021-02-19 RX ORDER — LIDOCAINE 40 MG/G
CREAM TOPICAL
Status: DISCONTINUED | OUTPATIENT
Start: 2021-02-19 | End: 2021-02-19 | Stop reason: HOSPADM

## 2021-02-19 RX ORDER — SODIUM CHLORIDE, SODIUM LACTATE, POTASSIUM CHLORIDE, CALCIUM CHLORIDE 600; 310; 30; 20 MG/100ML; MG/100ML; MG/100ML; MG/100ML
INJECTION, SOLUTION INTRAVENOUS CONTINUOUS
Status: DISCONTINUED | OUTPATIENT
Start: 2021-02-19 | End: 2021-02-19 | Stop reason: HOSPADM

## 2021-02-19 RX ORDER — GLYCOPYRROLATE 0.2 MG/ML
INJECTION, SOLUTION INTRAMUSCULAR; INTRAVENOUS PRN
Status: DISCONTINUED | OUTPATIENT
Start: 2021-02-19 | End: 2021-02-19

## 2021-02-19 RX ADMIN — LIDOCAINE HYDROCHLORIDE 100 MG: 10 INJECTION, SOLUTION INFILTRATION; PERINEURAL at 11:40

## 2021-02-19 RX ADMIN — LIDOCAINE HYDROCHLORIDE 0.1 ML: 10 INJECTION, SOLUTION EPIDURAL; INFILTRATION; INTRACAUDAL; PERINEURAL at 10:59

## 2021-02-19 RX ADMIN — SODIUM CHLORIDE, POTASSIUM CHLORIDE, SODIUM LACTATE AND CALCIUM CHLORIDE: 600; 310; 30; 20 INJECTION, SOLUTION INTRAVENOUS at 10:58

## 2021-02-19 RX ADMIN — PROPOFOL 200 MCG/KG/MIN: 10 INJECTION, EMULSION INTRAVENOUS at 11:40

## 2021-02-19 RX ADMIN — GLYCOPYRROLATE 0.2 MG: 0.2 INJECTION, SOLUTION INTRAMUSCULAR; INTRAVENOUS at 11:40

## 2021-02-19 RX ADMIN — PROPOFOL 20 MG: 10 INJECTION, EMULSION INTRAVENOUS at 11:47

## 2021-02-19 RX ADMIN — PROPOFOL 20 MG: 10 INJECTION, EMULSION INTRAVENOUS at 11:49

## 2021-02-19 ASSESSMENT — MIFFLIN-ST. JEOR: SCORE: 1579.77

## 2021-02-19 NOTE — H&P
50 year old year old female here for upper endoscopy for evaluation of LUQ abdominal pain.        Patient Active Problem List   Diagnosis     CARDIOVASCULAR SCREENING; LDL GOAL LESS THAN 160     Migraines     RA (rheumatoid arthritis) (H)     Pancreatitis     Cholelithiasis       Past Medical History:   Diagnosis Date     HPV (human papillomavirus)      Migraines      Pancreatitis      RA (rheumatoid arthritis) (H)        Past Surgical History:   Procedure Laterality Date     CHOLECYSTECTOMY  6/13/13     LAPAROSCOPIC CHOLECYSTECTOMY  6/13/2013    Procedure: LAPAROSCOPIC CHOLECYSTECTOMY;  Laparoscopic Cholecystetomy;  Surgeon: Joshua Quiñonez MD;  Location: [facility] OR       Family History   Problem Relation Age of Onset     Cancer Mother      Other Cancer Mother         Lung     Substance Abuse Mother         Alcohol     Cancer Father      Breast Cancer Maternal Grandmother      Cardiovascular Maternal Grandmother      C.A.D. Maternal Grandfather        No current outpatient medications on file.       Allergies   Allergen Reactions     Nkda [No Known Drug Allergies]        Pt reports that she has been smoking cigarettes. She has a 30.00 pack-year smoking history. She has never used smokeless tobacco. She reports current alcohol use. She reports that she does not use drugs.    Exam:    Awake, Alert OX3  Lungs - CTA bilaterally  CV - RRR, no murmurs, distal pulses intact  Abd - soft, non-distended, non-tender, +BS  Extr - No cyanosis or edema    A/P 50 year old year old female in need of upper endoscopy for evaluation of LUQ abdominal pain. Risks, benefits, alternatives, and complications were discussed including the possibility of perforation and the patient agreed to proceed.    Bobby Duffy MD

## 2021-02-19 NOTE — ANESTHESIA POSTPROCEDURE EVALUATION
Patient: Mariano Dey    Procedure(s):  ESOPHAGOGASTRODUODENOSCOPY, WITH BIOPSY    Diagnosis:Epigastric pain [R10.13]  Diagnosis Additional Information: No value filed.    Anesthesia Type:  MAC    Note:  Disposition: Outpatient   Postop Pain Control: Uneventful            Sign Out: Well controlled pain   PONV: No   Neuro/Psych: Uneventful            Sign Out: Acceptable/Baseline neuro status   Airway/Respiratory: Uneventful            Sign Out: Acceptable/Baseline resp. status   CV/Hemodynamics: Uneventful            Sign Out: Acceptable CV status   Other NRE: NONE   DID A NON-ROUTINE EVENT OCCUR? No         Last vitals:  Vitals:    02/19/21 1032   BP: 135/82   Pulse: 76   Resp: 18   Temp: 37.2  C (99  F)   SpO2: 99%       Last vitals prior to Anesthesia Care Transfer:  CRNA VITALS  2/19/2021 1124 - 2/19/2021 1154      2/19/2021             Pulse:  76    SpO2:  98 %          Electronically Signed By: MONICA Kinney CRNA  February 19, 2021  11:54 AM

## 2021-02-19 NOTE — BRIEF OP NOTE
[facility]   Brief Operative Note    Pre-operative diagnosis: Epigastric pain [R10.13]   Post-operative diagnosis miniimal duodenitis, otherwise normal     Procedure: Procedure(s):  ESOPHAGOGASTRODUODENOSCOPY, WITH BIOPSY   Surgeon(s): Surgeon(s) and Role:     * Bobby Duffy MD - Primary   Estimated blood loss: * No values recorded between 2/19/2021 11:45 AM and 2/19/2021 11:53 AM *    Specimens: ID Type Source Tests Collected by Time Destination   A :  Tissue Stomach, Antrum SURGICAL PATHOLOGY EXAM Bobby Duffy MD 2/19/2021 11:49 AM       Findings: 1. Normal esophagus, z-line variable at 40 cm  2. Stomach normal - biopsied for h. Pylori  3. Mild duodenitis without ulceration

## 2021-02-19 NOTE — ANESTHESIA CARE TRANSFER NOTE
Patient: Mariano Dey    Procedure(s):  ESOPHAGOGASTRODUODENOSCOPY, WITH BIOPSY    Diagnosis: Epigastric pain [R10.13]  Diagnosis Additional Information: No value filed.    Anesthesia Type:   MAC     Note:    Oropharynx: oropharynx clear of all foreign objects  Level of Consciousness: drowsy  Oxygen Supplementation: room air    Independent Airway: airway patency satisfactory and stable  Dentition: dentition unchanged  Vital Signs Stable: post-procedure vital signs reviewed and stable  Report to RN Given: handoff report given  Patient transferred to: Phase II    Handoff Report: Identifed the Patient, Identified the Reponsible Provider, Reviewed the pertinent medical history, Discussed the surgical course, Reviewed Intra-OP anesthesia mangement and issues during anesthesia, Set expectations for post-procedure period and Allowed opportunity for questions and acknowledgement of understanding      Vitals: (Last set prior to Anesthesia Care Transfer)  CRNA VITALS  2/19/2021 1124 - 2/19/2021 1154      2/19/2021             Pulse:  76    SpO2:  98 %        Electronically Signed By: MONICA Kinney CRNA  February 19, 2021  11:54 AM

## 2021-02-21 ENCOUNTER — HEALTH MAINTENANCE LETTER (OUTPATIENT)
Age: 51
End: 2021-02-21

## 2021-02-23 LAB — COPATH REPORT: NORMAL

## 2021-03-08 NOTE — PROGRESS NOTES
Rheumatology Clinic Visit-remote     Mariano Dey MRN# [id number]   YOB: 1970 Age: 50 year old     Date of Visit: 03/08/2021  Primary care provider: Shilpa Guerra          Assessment and Plan:     Mariano Dey is a 50 year old female with PMH significant for pancreatitis, migraines, recent gout flare who is presenting for evaluation of joint pain in the context of a remote diagnosis of RA.    #Arthritis  Pt had high RF (124 international unit(s)) and negative ELTON in 5/2000 with no testing or treatment since. Her current symmetric involvement of the bilateral PIP joints is consistent with RA, as is the clinical history of pain worse in the morning and improved with several hours of activity. If this is an RA flare, her normal ROM at the wrist and PIP joints and lack of joint deformities, suggest a more indolent course. It is unusual that she would have a gout flare with underlying RA as these two conditions are not typically comorbid. At the time of her gout flare in Oct 2020, a right foot x-ray showed osteophyte formation at the first MTP, but no erosions or joint space narrowing. Recent labs on February 5, 2021, including comprehensive metabolic panel, amylase, lipase, and CBC were all normal. Given how long ago her previous rheum labs were, I think its appropriate to repeat the RA workup with labs and Xrays of her bilateral hands. Will also recheck an ELTON.    Pt's abdominal pain seems unrelated to her arthritis. I considered various rheumatologic diagnoses with associated abdominal involvement including seronegative spondyloarthropathies, however these do not fit her clinical picture. Will order CXR to rule out respiratory etiology of upper abdominal pain. However in the context of heavy alcohol use and prior episode of acute pancreatitis, chronic pancreatitis seems more likely.    Plan:  -Labs: Rf, anti-CCP, ELTON, ESR, CRP  -Imaging: CXR, XR bilateral hands  -Trial prednisone 20 mg/d X 1  week, then 15 mg/d x 1 week to assess for improvement in joint pain  -Follow-up in 4-6 weeks       I was present with the medical student who participated in the service and in the documentation of the note. I have verified the history and personally performed the physical exam and medical decision making. I agree with the assessment and plan of care as documented in the note.    Chronic symmetric polyarthralgia has features of inflammatory arthritis. To gauge aggressiveness of possible rheumatoid arthritis, measure inflammatory markers and autoimmune serologies. Trial low-dose prednisone. If prednisone-sensitive symptoms associate with erosive disease, plan combination therapy with methotrexate and sulfasalazine and hydroxychloroquine vs methotrexate plus anti-TNF.    Progress report in 2-3 weeks.    F/u in 4-6 weeks.    Paolo Trimble M.D.  Staff Rheumatologist, [facility]  Pager [phone]         Active Problem List:     Patient Active Problem List    Diagnosis Date Noted     Cholelithiasis 06/13/2013     Priority: Medium     Problem list name updated by automated process. Provider to review and confirm       Pancreatitis 05/18/2013     Priority: Medium     Migraines      Priority: Medium     Patient given Migraine Education folder and Migraine Action Plan on May 10, 2011.       RA (rheumatoid arthritis) (H)      Priority: Medium     CARDIOVASCULAR SCREENING; LDL GOAL LESS THAN 160 10/31/2010     Priority: Medium            History of Present Illness:   Pablonguyen LUCIAN Dey presents for evaluation of arthritis. Referred by provider Xavi.     Pt was diagnosed with RA when she was 25yo. She doesn't recall what prompted the workup or whether or not she saw a rheumatologist at the time. She thinks the diagnosis was made with lab work. She did not have significant symptoms related to RA, so she never followed up to make a treatment plan. Recently, she was seen in the emergency room in October 2020 with acute forefoot  "pain. She was diagnosed with gouty arthritis, although uric acid was normal at level of 4.0. Her pain improved with prednisone, however the episode was exquisitely painful and motivated her to create a plan for her RA. Pt also has undiagnosed upper abdominal pain that she wonders if it could be related to her RA. Her pain wraps around to her back along the bra line. It is a constant pressure but can become acute with a severity of 5-6/10. She has associated vomiting with episodes, but no diarrhea or constipation. She notes that this pain is similar to her previous episode of pancreatitis. She had a small recent weight loss (normally 180-210) related to healthier eating.     She also has chronic joint pain that is symmetric and worse in the PIP joints and wrists of her bilateral hands. She has some pain in her bilateral ankles and R 1st MTP. Pain is exacerbated when she bends her right toes, so she is forced to walk down stairs one at a time. Her joint pain and swelling are worst in the mornings and improve after several hours of activity. No paresthesias, CP, SOP, fevers/chills, or skin changes. Pt denies weakness, but recalls episodes where her hands \"choose not to hold something\". If she has a cup of coffee she may drop it, so she has to hold it with 2 hands. Joint pain does not wake her up at night.     Pt smokes 1/2 pack per day for the last 35 years. She considers herself a heavy drinker, but cut back her alcohol consumption 6 weeks ago. She now tries to drink 1-2 glasses a night with a max of 7 drinks in a week.           Review of Systems:       Constitutional: negative  Skin: negative  Eyes: negative  Ears/Nose/Throat: negative  Respiratory: No shortness of breath, dyspnea on exertion, cough, or hemoptysis  Cardiovascular: negative  Gastrointestinal: See HPI.  Genitourinary: negative  Musculoskeletal: negative  Neurologic: negative  Psychiatric: negative  Hematologic/Lymphatic/Immunologic: " negative  Endocrine: negative          Past Medical History:     Past Medical History:   Diagnosis Date     HPV (human papillomavirus)      Migraines      Pancreatitis      RA (rheumatoid arthritis) (H)      Past Surgical History:   Procedure Laterality Date     CHOLECYSTECTOMY  13     ESOPHAGOSCOPY, GASTROSCOPY, DUODENOSCOPY (EGD), COMBINED N/A 2021    Procedure: ESOPHAGOGASTRODUODENOSCOPY, WITH BIOPSY;  Surgeon: Bobby Duffy MD;  Location: [facility]     LAPAROSCOPIC CHOLECYSTECTOMY  2013    Procedure: LAPAROSCOPIC CHOLECYSTECTOMY;  Laparoscopic Cholecystetomy;  Surgeon: Joshua Quiñonez MD;  Location: [facility]            Social History:     Social History     Occupational History     Not on file   Tobacco Use     Smoking status: Current Every Day Smoker     Packs/day: 1.00     Years: 30.00     Pack years: 30.00     Types: Cigarettes     Last attempt to quit: 2009     Years since quittin.1     Smokeless tobacco: Never Used   Substance and Sexual Activity     Alcohol use: Yes     Alcohol/week: 0.0 standard drinks     Comment: 2-3 beers daily     Drug use: No     Sexual activity: Not Currently     Partners: Male     Birth control/protection: None            Family History:     Family history is significant for RA, COPD, lung cancer, and breast cancer in her mom and maternal grandma. Her dad  in his 50s from pancreatic cancer.     Family History   Problem Relation Age of Onset     Cancer Mother      Other Cancer Mother         Lung     Substance Abuse Mother         Alcohol     Cancer Father      Breast Cancer Maternal Grandmother      Cardiovascular Maternal Grandmother      C.A.D. Maternal Grandfather             Allergies:     Allergies   Allergen Reactions     Nkda [No Known Drug Allergies]             Medications:     No current outpatient medications on file.            Physical Exam:   Last menstrual period 2015, not currently breastfeeding.  Wt Readings from Last 4 Encounters:    02/19/21 91.6 kg (202 lb)   02/05/21 94.3 kg (208 lb)   10/20/20 90.7 kg (200 lb)   11/12/19 94.9 kg (209 lb 3.2 oz)     Physical Exam was performed over video visit.   Constitutional: well-developed, appearing stated age; cooperative  Eyes: nl EOM, PERRLA, vision, conjunctiva, sclera  ENT: nl external ears, nose, hearing, lips  Neck: no obvious mass or thyroid enlargement  Resp/CV: breathing unlabored on RA  GI: Abd tenderness per pt exam  : not tested  MS: The wrist, MCP/PIP/DIP were examined. Bilateral swelling visible at all PIP joints bilaterally. Pt is able to make normal fist bilaterally. Nl ROM at the wrist bilaterally. No active synovitis or altered joint anatomy. Full joint ROM. Normal  strength. No dactylitis,  tenosynovitis, enthesopathy.  Skin: no nail pitting, alopecia, rash, nodules or lesions   Psych: nl judgement, orientation, affect.         Data:     No results found for any visits on 03/09/21.    RHEUM RESULTS Latest Ref Rng & Units 12/16/2014 6/29/2017 2/5/2021   AST 0 - 45 U/L 22 23 22   ALT 0 - 50 U/L 28 29 35   ALBUMIN 3.4 - 5.0 g/dL 4.1 3.7 3.8   WBC 4.0 - 11.0 10e9/L 9.3 11.2(H) 8.4   RBC 3.8 - 5.2 10e12/L 4.41 4.38 4.52   HGB 11.7 - 15.7 g/dL 14.0 13.7 14.3   HCT 35.0 - 47.0 % 42.1 41.8 42.8   MCV 78 - 100 fl 96 95 95   MCHC 31.5 - 36.5 g/dL 33.3 32.8 33.4   RDW 10.0 - 15.0 % 12.9 12.9 12.4    - 450 10e9/L 232 257 287   CREATININE 0.52 - 1.04 mg/dL 0.93 0.88 0.88   GFR ESTIMATE, IF BLACK >60 mL/min/[1.73:m2] 79 83 88   GFR ESTIMATE >60 mL/min/[1.73:m2] 65 69 76       Reviewed Rheumatology lab flowsheet      The patient has no pain/symptoms at this time and does not take any medications.    Mariano is a 50 year old who is being evaluated via a billable video visit.      How would you like to obtain your AVS? MyChart    Will anyone else be joining your video visit? No      Video Start Time: 08:30  Video-Visit Details    Type of service:  Video Visit    Video End Time:  09:16    Originating Location (pt. Location): Home    Distant Location (provider location):  [facility] [place]     Platform used for Video Visit: Alexi

## 2021-03-09 ENCOUNTER — VIRTUAL VISIT (OUTPATIENT)
Dept: RHEUMATOLOGY | Facility: CLINIC | Age: 51
End: 2021-03-09
Attending: INTERNAL MEDICINE
Payer: COMMERCIAL

## 2021-03-09 DIAGNOSIS — M06.9 RHEUMATOID ARTHRITIS INVOLVING MULTIPLE SITES, UNSPECIFIED WHETHER RHEUMATOID FACTOR PRESENT (H): ICD-10-CM

## 2021-03-09 DIAGNOSIS — M25.50 POLYARTHRALGIA: Primary | ICD-10-CM

## 2021-03-09 PROCEDURE — 99205 OFFICE O/P NEW HI 60 MIN: CPT | Mod: 95 | Performed by: INTERNAL MEDICINE

## 2021-03-09 RX ORDER — PREDNISONE 5 MG/1
TABLET ORAL
Qty: 50 TABLET | Refills: 0 | Status: SHIPPED | OUTPATIENT
Start: 2021-03-09 | End: 2021-03-26

## 2021-03-09 SDOH — HEALTH STABILITY: MENTAL HEALTH: HOW MANY STANDARD DRINKS CONTAINING ALCOHOL DO YOU HAVE ON A TYPICAL DAY?: NOT ASKED

## 2021-03-09 SDOH — HEALTH STABILITY: MENTAL HEALTH: HOW OFTEN DO YOU HAVE 6 OR MORE DRINKS ON ONE OCCASION?: NOT ASKED

## 2021-03-09 SDOH — HEALTH STABILITY: MENTAL HEALTH: HOW OFTEN DO YOU HAVE A DRINK CONTAINING ALCOHOL?: MONTHLY OR LESS

## 2021-03-09 NOTE — PATIENT INSTRUCTIONS
Chucky Quintana, it was nice to meet you today!    Today we talked about:    Joint Pain: Your joint pain may be related to RA, but we would like to repeat labs and workup.  - Repeat RA labs, chest xray, and hand xrays  - Try prednisone for 1 month and see if your joint pain decreases  - Can try heat therapy for painful joints  - Follow-up in 1 month to review lab results and develop long-term plan  - acetaminophen 500-1000 mg up to 3 times daily can be used for residual hand pain.

## 2021-03-09 NOTE — LETTER
3/9/2021       RE: Mariano Dey  [address] [place] [address] N  [facility] [address]     Dear Colleague,    Thank you for referring your patient, Mariano Dey, to the [facility] RHEUMATOLOGY [facility]INIC [place] at [facility]. Please see a copy of my visit note below.      Rheumatology Clinic Visit-remote     Mariano Dey MRN# [id number]   YOB: 1970 Age: 50 year old     Date of Visit: 03/08/2021  Primary care provider: Shilpa Guerra          Assessment and Plan:     Mariano Dey is a 50 year old female with PMH significant for pancreatitis, migraines, recent gout flare who is presenting for evaluation of joint pain in the context of a remote diagnosis of RA.    #Arthritis  Pt had high RF (124 international unit(s)) and negative ELTON in 5/2000 with no testing or treatment since. Her current symmetric involvement of the bilateral PIP joints is consistent with RA, as is the clinical history of pain worse in the morning and improved with several hours of activity. If this is an RA flare, her normal ROM at the wrist and PIP joints and lack of joint deformities, suggest a more indolent course. It is unusual that she would have a gout flare with underlying RA as these two conditions are not typically comorbid. At the time of her gout flare in Oct 2020, a right foot x-ray showed osteophyte formation at the first MTP, but no erosions or joint space narrowing. Recent labs on February 5, 2021, including comprehensive metabolic panel, amylase, lipase, and CBC were all normal. Given how long ago her previous rheum labs were, I think its appropriate to repeat the RA workup with labs and Xrays of her bilateral hands. Will also recheck an ELTON.    Pt's abdominal pain seems unrelated to her arthritis. I considered various rheumatologic diagnoses with associated abdominal involvement including seronegative spondyloarthropathies, however these do not fit  her clinical picture. Will order CXR to rule out respiratory etiology of upper abdominal pain. However in the context of heavy alcohol use and prior episode of acute pancreatitis, chronic pancreatitis seems more likely.    Plan:  -Labs: Rf, anti-CCP, ELTON, ESR, CRP  -Imaging: CXR, XR bilateral hands  -Trial prednisone 20 mg/d X 1 week, then 15 mg/d x 1 week to assess for improvement in joint pain  -Follow-up in 4-6 weeks       I was present with the medical student who participated in the service and in the documentation of the note. I have verified the history and personally performed the physical exam and medical decision making. I agree with the assessment and plan of care as documented in the note.    Chronic symmetric polyarthralgia has features of inflammatory arthritis. To gauge aggressiveness of possible rheumatoid arthritis, measure inflammatory markers and autoimmune serologies. Trial low-dose prednisone. If prednisone-sensitive symptoms associate with erosive disease, plan combination therapy with methotrexate and sulfasalazine and hydroxychloroquine vs methotrexate plus anti-TNF.    Progress report in 2-3 weeks.    F/u in 4-6 weeks.    Paolo Trimble M.D.  Staff Rheumatologist, [facility]  Pager [phone]         Active Problem List:     Patient Active Problem List    Diagnosis Date Noted     Cholelithiasis 06/13/2013     Priority: Medium     Problem list name updated by automated process. Provider to review and confirm       Pancreatitis 05/18/2013     Priority: Medium     Migraines      Priority: Medium     Patient given Migraine Education folder and Migraine Action Plan on May 10, 2011.       RA (rheumatoid arthritis) (H)      Priority: Medium     CARDIOVASCULAR SCREENING; LDL GOAL LESS THAN 160 10/31/2010     Priority: Medium            History of Present Illness:   Mariano Dey presents for evaluation of arthritis. Referred by provider Xavi.     Pt was diagnosed with RA when she was 23yo.  "She doesn't recall what prompted the workup or whether or not she saw a rheumatologist at the time. She thinks the diagnosis was made with lab work. She did not have significant symptoms related to RA, so she never followed up to make a treatment plan. Recently, she was seen in the emergency room in October 2020 with acute forefoot pain. She was diagnosed with gouty arthritis, although uric acid was normal at level of 4.0. Her pain improved with prednisone, however the episode was exquisitely painful and motivated her to create a plan for her RA. Pt also has undiagnosed upper abdominal pain that she wonders if it could be related to her RA. Her pain wraps around to her back along the bra line. It is a constant pressure but can become acute with a severity of 5-6/10. She has associated vomiting with episodes, but no diarrhea or constipation. She notes that this pain is similar to her previous episode of pancreatitis. She had a small recent weight loss (normally 180-210) related to healthier eating.     She also has chronic joint pain that is symmetric and worse in the PIP joints and wrists of her bilateral hands. She has some pain in her bilateral ankles and R 1st MTP. Pain is exacerbated when she bends her right toes, so she is forced to walk down stairs one at a time. Her joint pain and swelling are worst in the mornings and improve after several hours of activity. No paresthesias, CP, SOP, fevers/chills, or skin changes. Pt denies weakness, but recalls episodes where her hands \"choose not to hold something\". If she has a cup of coffee she may drop it, so she has to hold it with 2 hands. Joint pain does not wake her up at night.     Pt smokes 1/2 pack per day for the last 35 years. She considers herself a heavy drinker, but cut back her alcohol consumption 6 weeks ago. She now tries to drink 1-2 glasses a night with a max of 7 drinks in a week.           Review of Systems:       Constitutional: negative  Skin: " negative  Eyes: negative  Ears/Nose/Throat: negative  Respiratory: No shortness of breath, dyspnea on exertion, cough, or hemoptysis  Cardiovascular: negative  Gastrointestinal: See HPI.  Genitourinary: negative  Musculoskeletal: negative  Neurologic: negative  Psychiatric: negative  Hematologic/Lymphatic/Immunologic: negative  Endocrine: negative          Past Medical History:     Past Medical History:   Diagnosis Date     HPV (human papillomavirus)      Migraines      Pancreatitis      RA (rheumatoid arthritis) (H)      Past Surgical History:   Procedure Laterality Date     CHOLECYSTECTOMY  12     ESOPHAGOSCOPY, GASTROSCOPY, DUODENOSCOPY (EGD), COMBINED N/A 2021    Procedure: ESOPHAGOGASTRODUODENOSCOPY, WITH BIOPSY;  Surgeon: Bobby Duffy MD;  Location: [facility]     LAPAROSCOPIC CHOLECYSTECTOMY  2013    Procedure: LAPAROSCOPIC CHOLECYSTECTOMY;  Laparoscopic Cholecystetomy;  Surgeon: Joshua Quiñonez MD;  Location: [facility]            Social History:     Social History     Occupational History     Not on file   Tobacco Use     Smoking status: Current Every Day Smoker     Packs/day: 1.00     Years: 30.00     Pack years: 30.00     Types: Cigarettes     Last attempt to quit: 2009     Years since quittin.1     Smokeless tobacco: Never Used   Substance and Sexual Activity     Alcohol use: Yes     Alcohol/week: 0.0 standard drinks     Comment: 2-3 beers daily     Drug use: No     Sexual activity: Not Currently     Partners: Male     Birth control/protection: None            Family History:     Family history is significant for RA, COPD, lung cancer, and breast cancer in her mom and maternal grandma. Her dad  in his 50s from pancreatic cancer.     Family History   Problem Relation Age of Onset     Cancer Mother      Other Cancer Mother         Lung     Substance Abuse Mother         Alcohol     Cancer Father      Breast Cancer Maternal Grandmother      Cardiovascular Maternal Grandmother      CARMEN  Maternal Grandfather             Allergies:     Allergies   Allergen Reactions     Nkda [No Known Drug Allergies]             Medications:     No current outpatient medications on file.            Physical Exam:   Last menstrual period 07/16/2015, not currently breastfeeding.  Wt Readings from Last 4 Encounters:   02/19/21 91.6 kg (202 lb)   02/05/21 94.3 kg (208 lb)   10/20/20 90.7 kg (200 lb)   11/12/19 94.9 kg (209 lb 3.2 oz)     Physical Exam was performed over video visit.   Constitutional: well-developed, appearing stated age; cooperative  Eyes: nl EOM, PERRLA, vision, conjunctiva, sclera  ENT: nl external ears, nose, hearing, lips  Neck: no obvious mass or thyroid enlargement  Resp/CV: breathing unlabored on RA  GI: Abd tenderness per pt exam  : not tested  MS: The wrist, MCP/PIP/DIP were examined. Bilateral swelling visible at all PIP joints bilaterally. Pt is able to make normal fist bilaterally. Nl ROM at the wrist bilaterally. No active synovitis or altered joint anatomy. Full joint ROM. Normal  strength. No dactylitis,  tenosynovitis, enthesopathy.  Skin: no nail pitting, alopecia, rash, nodules or lesions   Psych: nl judgement, orientation, affect.         Data:     No results found for any visits on 03/09/21.    RHEUM RESULTS Latest Ref Rng & Units 12/16/2014 6/29/2017 2/5/2021   AST 0 - 45 U/L 22 23 22   ALT 0 - 50 U/L 28 29 35   ALBUMIN 3.4 - 5.0 g/dL 4.1 3.7 3.8   WBC 4.0 - 11.0 10e9/L 9.3 11.2(H) 8.4   RBC 3.8 - 5.2 10e12/L 4.41 4.38 4.52   HGB 11.7 - 15.7 g/dL 14.0 13.7 14.3   HCT 35.0 - 47.0 % 42.1 41.8 42.8   MCV 78 - 100 fl 96 95 95   MCHC 31.5 - 36.5 g/dL 33.3 32.8 33.4   RDW 10.0 - 15.0 % 12.9 12.9 12.4    - 450 10e9/L 232 257 287   CREATININE 0.52 - 1.04 mg/dL 0.93 0.88 0.88   GFR ESTIMATE, IF BLACK >60 mL/min/[1.73:m2] 79 83 88   GFR ESTIMATE >60 mL/min/[1.73:m2] 65 69 76       Reviewed Rheumatology lab flowsheet      The patient has no pain/symptoms at this time and does not  take any medications.    Mariano is a 50 year old who is being evaluated via a billable video visit.      How would you like to obtain your AVS? MyChart    Will anyone else be joining your video visit? No      Video Start Time: 08:30  Video-Visit Details    Type of service:  Video Visit    Video End Time: 09:16    Originating Location (pt. Location): Home    Distant Location (provider location):  [facility]HEUMAT[facility] [place]     Platform used for Video Visit: Alexi        Again, thank you for allowing me to participate in the care of your patient.      Sincerely,    Paolo Trimble MD

## 2021-03-26 ENCOUNTER — OFFICE VISIT (OUTPATIENT)
Dept: FAMILY MEDICINE | Facility: CLINIC | Age: 51
End: 2021-03-26
Payer: COMMERCIAL

## 2021-03-26 ENCOUNTER — HOSPITAL ENCOUNTER (OUTPATIENT)
Dept: GENERAL RADIOLOGY | Facility: CLINIC | Age: 51
End: 2021-03-26
Attending: INTERNAL MEDICINE
Payer: COMMERCIAL

## 2021-03-26 VITALS
HEIGHT: 68 IN | SYSTOLIC BLOOD PRESSURE: 136 MMHG | HEART RATE: 69 BPM | RESPIRATION RATE: 16 BRPM | WEIGHT: 207.4 LBS | TEMPERATURE: 96.5 F | BODY MASS INDEX: 31.43 KG/M2 | DIASTOLIC BLOOD PRESSURE: 74 MMHG | OXYGEN SATURATION: 98 %

## 2021-03-26 DIAGNOSIS — Z23 NEED FOR VACCINATION: ICD-10-CM

## 2021-03-26 DIAGNOSIS — M25.50 POLYARTHRALGIA: ICD-10-CM

## 2021-03-26 DIAGNOSIS — Z00.00 ANNUAL PHYSICAL EXAM: Primary | ICD-10-CM

## 2021-03-26 PROCEDURE — 87624 HPV HI-RISK TYP POOLED RSLT: CPT | Performed by: NURSE PRACTITIONER

## 2021-03-26 PROCEDURE — G0145 SCR C/V CYTO,THINLAYER,RESCR: HCPCS | Performed by: NURSE PRACTITIONER

## 2021-03-26 PROCEDURE — 90714 TD VACC NO PRESV 7 YRS+ IM: CPT | Performed by: NURSE PRACTITIONER

## 2021-03-26 PROCEDURE — 99396 PREV VISIT EST AGE 40-64: CPT | Mod: 25 | Performed by: NURSE PRACTITIONER

## 2021-03-26 PROCEDURE — 90471 IMMUNIZATION ADMIN: CPT | Performed by: NURSE PRACTITIONER

## 2021-03-26 PROCEDURE — 73130 X-RAY EXAM OF HAND: CPT | Mod: 50

## 2021-03-26 PROCEDURE — 71046 X-RAY EXAM CHEST 2 VIEWS: CPT

## 2021-03-26 ASSESSMENT — MIFFLIN-ST. JEOR: SCORE: 1604.26

## 2021-03-26 NOTE — PROGRESS NOTES
SUBJECTIVE:   CC: Mariano Dey is an 50 year old woman who presents for preventive health visit.       Patient has been advised of split billing requirements and indicates understanding: Yes  Healthy Habits:     Getting at least 3 servings of Calcium per day:  Yes    Bi-annual eye exam:  Yes    Dental care twice a year:  Yes    Sleep apnea or symptoms of sleep apnea:  None    Diet:  Regular (no restrictions)    Frequency of exercise:  None    Duration of exercise:  N/A    Taking medications regularly:  Not Applicable    Barriers to taking medications:  Not applicable    Medication side effects:  Not applicable    PHQ-2 Total Score: 0    Additional concerns today:  No    Today's PHQ-2 Score:   PHQ-2 (  [employer]) 3/26/2021   Q1: Little interest or pleasure in doing things 0   Q2: Feeling down, depressed or hopeless 0   PHQ-2 Score 0       Abuse: Current or Past (Physical, Sexual or Emotional) - NO  Do you feel safe in your environment? Yes    Have you ever done Advance Care Planning? (For example, a Health Directive, POLST, or a discussion with a medical provider or your loved ones about your wishes): No, advance care planning information given to patient to review.  Patient declined advance care planning discussion at this time.    Social History     Tobacco Use     Smoking status: Current Every Day Smoker     Packs/day: 1.00     Years: 30.00     Pack years: 30.00     Types: Cigarettes     Last attempt to quit: 2009     Years since quittin.1     Smokeless tobacco: Never Used   Substance Use Topics     Alcohol use: Yes     Frequency: Monthly or less     Comment: 6 beers a week      If you drink alcohol do you typically have >3 drinks per day or >7 drinks per week? No    Alcohol Use 2015   Prescreen: >3 drinks/day or >7 drinks/week? The patient does not drink >3 drinks per day nor >7 drinks per week.       Reviewed orders with patient.  Reviewed health maintenance and updated orders accordingly -  Yes  Lab work is in process  Labs reviewed in EPIC  BP Readings from Last 3 Encounters:   03/26/21 136/74   02/19/21 136/87   02/05/21 138/76    Wt Readings from Last 3 Encounters:   03/26/21 94.1 kg (207 lb 6.4 oz)   02/19/21 91.6 kg (202 lb)   02/05/21 94.3 kg (208 lb)                    Breast Cancer Screening:  Any new diagnosis of family breast, ovarian, or bowel cancer? No      Mammogram Screening: Recommended annual mammography  Pertinent mammograms are reviewed under the imaging tab.    History of abnormal Pap smear: NO - age 30- 65 PAP every 3 years recommended  PAP / HPV Latest Ref Rng & Units 7/24/2015 6/2/2010 5/26/2009   PAP - NIL NIL NIL   HPV 16 DNA NEG Negative - -   HPV 18 DNA NEG Negative - -   OTHER HR HPV NEG Negative - -     Reviewed and updated as needed this visit by clinical staff  Tobacco  Allergies  Meds   Med Hx  Surg Hx  Fam Hx  Soc Hx        Reviewed and updated as needed this visit by Provider                    Review of Systems  CONSTITUTIONAL: NEGATIVE for fever, chills, change in weight  INTEGUMENTARY/SKIN: NEGATIVE for worrisome rashes, moles or lesions  EYES: NEGATIVE for vision changes or irritation  ENT: NEGATIVE for ear, mouth and throat problems  RESP: NEGATIVE for significant cough or SOB  BREAST: NEGATIVE for masses, tenderness or discharge  CV: NEGATIVE for chest pain, palpitations or peripheral edema  GI: NEGATIVE for nausea, abdominal pain, heartburn, or change in bowel habits  : NEGATIVE for unusual urinary or vaginal symptoms. No vaginal bleeding.  MUSCULOSKELETAL: NEGATIVE for significant arthralgias or myalgia  NEURO: NEGATIVE for weakness, dizziness or paresthesias  PSYCHIATRIC: NEGATIVE for changes in mood or affect      OBJECTIVE:   LMP 07/16/2015   Physical Exam  GENERAL: healthy, alert and no distress  EYES: Eyes grossly normal to inspection, PERRL and conjunctivae and sclerae normal  HENT: ear canals and TM's normal, nose and mouth without ulcers or  "lesions  NECK: no adenopathy, no asymmetry, masses, or scars and thyroid normal to palpation  RESP: lungs clear to auscultation - no rales, rhonchi or wheezes  CV: regular rate and rhythm, normal S1 S2, no S3 or S4, no murmur, click or rub, no peripheral edema and peripheral pulses strong   (female): normal female external genitalia, normal urethral meatus, vaginal mucosa, normal cervix/adnexa/uterus without masses or discharge  MS: no gross musculoskeletal defects noted, no edema  SKIN: no suspicious lesions or rashes  NEURO: Normal strength and tone, mentation intact and speech normal  PSYCH: mentation appears normal, affect normal/bright    Diagnostic Test Results:  Labs reviewed in Epic    ASSESSMENT/PLAN:   1. Annual physical exam    - Pap imaged thin layer screen with HPV - recommended age 30 - 65 years (select HPV order below)  - HPV High Risk Types DNA Cervical  - Glucose FUTURE anytime; Future  - Lipid panel reflex to direct LDL Fasting; Future    2. Need for vaccination    - TD PRSERV FREE >=7 YRS ADS IM [[id number]]    Patient has been advised of split billing requirements and indicates understanding: Yes  COUNSELING:  Reviewed preventive health counseling, as reflected in patient instructions       Regular exercise       Healthy diet/nutrition       Consider lung cancer screening for ages 55-80 years and 30 pack-year smoking history, patient will start screening at age of 55    Estimated body mass index is 30.71 kg/m  as calculated from the following:    Height as of 2/19/21: 1.727 m (5' 8\").    Weight as of 2/19/21: 91.6 kg (202 lb).      She reports that she has been smoking cigarettes. She has a 30.00 pack-year smoking history. She has never used smokeless tobacco.  Tobacco Cessation Action Plan:   Information offered: Patient not interested at this time States she is not ready to quit smoking, will try in the future       Counseling Resources:  ATP IV Guidelines  Pooled Cohorts Equation " Calculator  Breast Cancer Risk Calculator  BRCA-Related Cancer Risk Assessment: FHS-7 Tool  FRAX Risk Assessment  ICSI Preventive Guidelines  Dietary Guidelines for Americans, 2010  USDA's MyPlate  ASA Prophylaxis  Lung CA Screening    MONICA Trujillo [facility]

## 2021-03-30 DIAGNOSIS — Z00.00 ANNUAL PHYSICAL EXAM: ICD-10-CM

## 2021-03-30 DIAGNOSIS — M25.50 POLYARTHRALGIA: ICD-10-CM

## 2021-03-30 LAB
CHOLEST SERPL-MCNC: 242 MG/DL
COPATH REPORT: NORMAL
CRP SERPL-MCNC: 4.3 MG/L (ref 0–8)
ERYTHROCYTE [SEDIMENTATION RATE] IN BLOOD BY WESTERGREN METHOD: 10 MM/H (ref 0–30)
GLUCOSE SERPL-MCNC: 98 MG/DL (ref 70–99)
HDLC SERPL-MCNC: 50 MG/DL
LDLC SERPL CALC-MCNC: 155 MG/DL
NONHDLC SERPL-MCNC: 192 MG/DL
PAP: NORMAL
TRIGL SERPL-MCNC: 184 MG/DL
URATE SERPL-MCNC: 4.4 MG/DL (ref 2.6–6)

## 2021-03-30 PROCEDURE — 86140 C-REACTIVE PROTEIN: CPT | Performed by: INTERNAL MEDICINE

## 2021-03-30 PROCEDURE — 85652 RBC SED RATE AUTOMATED: CPT | Performed by: INTERNAL MEDICINE

## 2021-03-30 PROCEDURE — 82947 ASSAY GLUCOSE BLOOD QUANT: CPT | Performed by: NURSE PRACTITIONER

## 2021-03-30 PROCEDURE — 86200 CCP ANTIBODY: CPT | Performed by: INTERNAL MEDICINE

## 2021-03-30 PROCEDURE — 36415 COLL VENOUS BLD VENIPUNCTURE: CPT | Performed by: INTERNAL MEDICINE

## 2021-03-30 PROCEDURE — 86431 RHEUMATOID FACTOR QUANT: CPT | Performed by: INTERNAL MEDICINE

## 2021-03-30 PROCEDURE — 80061 LIPID PANEL: CPT | Performed by: NURSE PRACTITIONER

## 2021-03-30 PROCEDURE — 84550 ASSAY OF BLOOD/URIC ACID: CPT | Performed by: INTERNAL MEDICINE

## 2021-03-30 PROCEDURE — 86038 ANTINUCLEAR ANTIBODIES: CPT | Performed by: INTERNAL MEDICINE

## 2021-03-31 LAB
ANA SER QL IF: NEGATIVE
CCP AB SER IA-ACNC: 1 U/ML
FINAL DIAGNOSIS: NORMAL
HPV HR 12 DNA CVX QL NAA+PROBE: NEGATIVE
HPV16 DNA SPEC QL NAA+PROBE: NEGATIVE
HPV18 DNA SPEC QL NAA+PROBE: NEGATIVE
RHEUMATOID FACT SER NEPH-ACNC: 86 IU/ML (ref 0–20)
SPECIMEN DESCRIPTION: NORMAL
SPECIMEN SOURCE CVX/VAG CYTO: NORMAL

## 2021-04-01 DIAGNOSIS — E78.5 HYPERLIPIDEMIA LDL GOAL <100: Primary | ICD-10-CM

## 2021-04-05 NOTE — PROGRESS NOTES
"  Rheumatology Clinic Visit-remote     Mariano Dey MRN# [id number]   YOB: 1970 Age: 50 year old     Date of Visit: 04/06/2021  Primary care provider: Shilpa Guerra          Assessment and Plan:       # Polyarthralgia, +RF: Chronic, low-grade pain in knuckles and finger joints showed no response to prednisone, and intensity of pain is mild and is only disruptive to activities of daily living occasionally.  Video exam shows full range of motion and no visible swelling or asymmetry in the MCPs or PIPs.  Blood work on March 30, 2021 showed rheumatoid factor 86, CCP negative, sed rate of 10 and CRP of 4.3.  ELTON was negative.  Uric acid was 4.4.Chest x-ray was normal. Hand films on March 26, 2021 showed radial deviation of the distal fifth phalanx. No joint space narrowing or erosions.Apart from the positive rheumatoid factor, markers that help assess risk of progressive, damaging arthritis (inflammatory markers, cyclic citrullinated peptide antibodies) are negative.  X-rays show no inflammatory joint damage.     I think it premature to label the clinical scenario \"rheumatoid arthritis\" despite the presence of chronic, symmetrical small joint predominant pain and the positive rheumatoid factor..  There is some evidence of osteoarthritis, and the long-term prognosis is good based on the patient's lack of progression to erosive damaging disease in the 20 years since rheumatoid factor was first detected.  I think it reasonable to adopt a stance of \"watchful waiting\", where in therapy to suppress inflammation and joint damage can be put on hold and only be used when or if symptoms escalate.     # Single episode of severe great toe pain: The nature of the great toe pain episode strongly suggests gout or pseudogout as the cause.  There has been no recurrence thankfully.  I recommend an \"abortive\" approach to treating future probable gouty flares.  Uric acid lowering therapy taken chronically would be an " "approach to increasing frequency and severity of gouty flares.    # Abdominal pain: Intensity of the symptom has receded since the last visit.  Chest x-ray was clear.  Etiology of the pain remains obscure, but it is safe to monitor for now.    Plan:  1.  For chronic finger pain, monitor levels of morning stiffness, joint swelling, disruption in activities of daily living.  Use acetaminophen 500 to 1000 mg up to 3 times a day as needed for pain.  Notify rheumatology if joint pain increases.  2.  To prevent gout flares: At the first sign of a recurrent flare, take 20 mg of prednisone.  Take 20 mg of prednisone each day for the next 4 days, then stop.  Notify rheumatology after using a course of prednisone so that the medication can be refilled.    -Follow-up in 6 mos       I spent >50% of this 35 minute encounter in counseling and coordination of care regarding the patient's complex medical problem of chronic joint pain.    Paolo Trimble M.D.  Staff Rheumatologist, [facility]  Pager [phone]         Active Problem List:     Patient Active Problem List    Diagnosis Date Noted     Cholelithiasis 06/13/2013     Priority: Medium     Problem list name updated by automated process. Provider to review and confirm       Pancreatitis 05/18/2013     Priority: Medium     Migraines      Priority: Medium     Patient given Migraine Education folder and Migraine Action Plan on May 10, 2011.       RA (rheumatoid arthritis) (H)      Priority: Medium     CARDIOVASCULAR SCREENING; LDL GOAL LESS THAN 160 10/31/2010     Priority: Medium            History of Present Illness:   Mariano Dey presents for follow-up of joint pain.  She was last seen March 9, 2021.  Impression/plan at that time was \"Chronic symmetric polyarthralgia has features of inflammatory arthritis. To gauge aggressiveness of possible rheumatoid arthritis, measure inflammatory markers and autoimmune serologies. Trial low-dose prednisone. If prednisone-sensitive " "symptoms associate with erosive disease, plan combination therapy with methotrexate and sulfasalazine and hydroxychloroquine vs methotrexate plus anti-TNF.    Interval history 04-:    She took prednisone for 2 weeks after the last visit.. She noted no difference in either joint pain stiffness or swelling.  Now she reports \"2 out of 10\" intensity pain affecting primarily the knuckles and first row of finger joints in both hands.  There is minimal disruption of activities of daily living, including repetitive actions and grasping and lifting.  Morning stiffness is less dramatic than previously.  She has not moved to use nonsteroidals or over-the-counter remedies with any frequency.    Although she has not had any recurrence of right great toe swelling and pain since the last visit, she remains very interested in determining a plan for how to handle possible recurrence.  The severity of the pain was such that she would go to great lengths to avoid repeating the episode.    Hx 3-9-2021    Pt was diagnosed with RA when she was 25yo. She doesn't recall what prompted the workup or whether or not she saw a rheumatologist at the time. She thinks the diagnosis was made with lab work. She did not have significant symptoms related to RA, so she never followed up to make a treatment plan. Recently, she was seen in the emergency room in October 2020 with acute forefoot pain. She was diagnosed with gouty arthritis, although uric acid was normal at level of 4.0. Her pain improved with prednisone, however the episode was exquisitely painful and motivated her to create a plan for her RA. Pt also has undiagnosed upper abdominal pain that she wonders if it could be related to her RA. Her pain wraps around to her back along the bra line. It is a constant pressure but can become acute with a severity of 5-6/10. She has associated vomiting with episodes, but no diarrhea or constipation. She notes that this pain is similar to her " "previous episode of pancreatitis. She had a small recent weight loss (normally 180-210) related to healthier eating.     She also has chronic joint pain that is symmetric and worse in the PIP joints and wrists of her bilateral hands. She has some pain in her bilateral ankles and R 1st MTP. Pain is exacerbated when she bends her right toes, so she is forced to walk down stairs one at a time. Her joint pain and swelling are worst in the mornings and improve after several hours of activity. No paresthesias, CP, SOP, fevers/chills, or skin changes. Pt denies weakness, but recalls episodes where her hands \"choose not to hold something\". If she has a cup of coffee she may drop it, so she has to hold it with 2 hands. Joint pain does not wake her up at night.     Pt smokes 1/2 pack per day for the last 35 years. She considers herself a heavy drinker, but cut back her alcohol consumption 6 weeks ago. She now tries to drink 1-2 glasses a night with a max of 7 drinks in a week.           Review of Systems:       Constitutional: negative  Skin: negative  Eyes: negative  Ears/Nose/Throat: negative  Respiratory: No shortness of breath, dyspnea on exertion, cough, or hemoptysis  Cardiovascular: negative  Gastrointestinal: See HPI.  Genitourinary: negative  Musculoskeletal: negative  Neurologic: negative  Psychiatric: negative  Hematologic/Lymphatic/Immunologic: negative  Endocrine: negative          Past Medical History:     Past Medical History:   Diagnosis Date     HPV (human papillomavirus)      Migraines      Pancreatitis      RA (rheumatoid arthritis) (H)      Past Surgical History:   Procedure Laterality Date     CHOLECYSTECTOMY  6/13/13     ESOPHAGOSCOPY, GASTROSCOPY, DUODENOSCOPY (EGD), COMBINED N/A 2/19/2021    Procedure: ESOPHAGOGASTRODUODENOSCOPY, WITH BIOPSY;  Surgeon: Bobby Duffy MD;  [facility]     LAPAROSCOPIC CHOLECYSTECTOMY  6/13/2013    Procedure: LAPAROSCOPIC CHOLECYSTECTOMY;  Laparoscopic " Cholecystetomy;  Surgeon: Joshua Quiñonez MD;  Location: [facility]            Social History:     Social History     Occupational History     Not on file   Tobacco Use     Smoking status: Current Every Day Smoker     Packs/day: 1.00     Years: 30.00     Pack years: 30.00     Types: Cigarettes     Last attempt to quit: 2009     Years since quittin.1     Smokeless tobacco: Never Used   Substance and Sexual Activity     Alcohol use: Yes     Frequency: Monthly or less     Comment: 6 beers a week      Drug use: No     Sexual activity: Not Currently     Partners: Male     Birth control/protection: None     Heavy alcohol use: 3- 7 drinks per week.   Smokes 1/2 ppd       Family History:     Family history is significant for RA, COPD, lung cancer, and breast cancer in her mom and maternal grandma. Her dad  in his 50s from pancreatic cancer.     Family History   Problem Relation Age of Onset     Cancer Mother      Other Cancer Mother         Lung     Substance Abuse Mother         Alcohol     Cancer Father      Breast Cancer Maternal Grandmother      Cardiovascular Maternal Grandmother      C.A.D. Maternal Grandfather             Allergies:     Allergies   Allergen Reactions     Nkda [No Known Drug Allergies]             Medications:     No current outpatient medications on file.            Physical Exam:   Last menstrual period 2015, not currently breastfeeding.  Wt Readings from Last 4 Encounters:   21 94.1 kg (207 lb 6.4 oz)   21 91.6 kg (202 lb)   21 94.3 kg (208 lb)   10/20/20 90.7 kg (200 lb)     Physical Exam was performed over video visit.   Constitutional: well-developed, appearing stated age; cooperative  Eyes: nl EOM, PERRLA, vision, conjunctiva, sclera  ENT: nl external ears, nose, hearing, lips  Neck: no obvious mass or thyroid enlargement  Resp/CV: breathing unlabored on RA  GI: Abd tenderness per pt exam  : not tested  MS: The wrist, MCP/PIP/DIP were examined. Bilateral  swelling visible at all PIP joints bilaterally. Pt is able to make normal fist bilaterally. Nl ROM at the wrist bilaterally. No active synovitis or altered joint anatomy. Full joint ROM. Normal  strength. No dactylitis,  tenosynovitis, enthesopathy.  Skin: no nail pitting, alopecia, rash, nodules or lesions   Psych: nl judgement, orientation, affect.         Data:     No results found for any visits on 04/06/21.  RHEUM RESULTS Latest Ref Rng & Units 6/29/2017 2/5/2021 3/30/2021   SED RATE 0 - 30 mm/h - - 10   CRP, INFLAMMATION 0.0 - 8.0 mg/L - - 4.3   RHEUMATOID FACTOR <12 IU/mL - - 86(H)   AST 0 - 45 U/L 23 22 -   ALT 0 - 50 U/L 29 35 -   ALBUMIN 3.4 - 5.0 g/dL 3.7 3.8 -   WBC 4.0 - 11.0 10e9/L 11.2(H) 8.4 -   RBC 3.8 - 5.2 10e12/L 4.38 4.52 -   HGB 11.7 - 15.7 g/dL 13.7 14.3 -   HCT 35.0 - 47.0 % 41.8 42.8 -   MCV 78 - 100 fl 95 95 -   MCHC 31.5 - 36.5 g/dL 32.8 33.4 -   RDW 10.0 - 15.0 % 12.9 12.4 -    - 450 10e9/L 257 287 -   CREATININE 0.52 - 1.04 mg/dL 0.88 0.88 -   GFR ESTIMATE, IF BLACK >60 mL/min/[1.73:m2] 83 88 -   GFR ESTIMATE >60 mL/min/[1.73:m2] 69 76 -       Rheumatoid Factor   Date Value Ref Range Status   03/30/2021 86 (H) <12 IU/mL Final   ,   Cyclic Citrullinated Peptide Antibody, IgG   Date Value Ref Range Status   03/30/2021 1 <7 U/mL Final     Comment:     Negative   ,  ,  ,  ,  ,   ELTON interpretation   Date Value Ref Range Status   03/30/2021 Negative NEG^Negative Final     Comment:                                        Reference range:  <1:40  NEGATIVE  1:40 - 1:80  BORDERLINE POSITIVE  >1:80 POSITIVE       Mariano is a 50 year old who is being evaluated via a billable video visit.      How would you like to obtain your AVS? MyChart    Will anyone else be joining your video visit? No      Video Start Time: 3:19 PM  Video-Visit Details    Type of service:  Video Visit    Video End Time:4:05 PM    Originating Location (pt. Location): Home    Distant Location (provider location):    [facility]UM[facility] [place]     Platform used for Video Visit: Alexi

## 2021-04-06 ENCOUNTER — VIRTUAL VISIT (OUTPATIENT)
Dept: RHEUMATOLOGY | Facility: CLINIC | Age: 51
End: 2021-04-06
Attending: INTERNAL MEDICINE
Payer: COMMERCIAL

## 2021-04-06 DIAGNOSIS — M25.50 POLYARTHRALGIA: Primary | ICD-10-CM

## 2021-04-06 PROCEDURE — 99214 OFFICE O/P EST MOD 30 MIN: CPT | Mod: 95 | Performed by: INTERNAL MEDICINE

## 2021-04-06 NOTE — LETTER
"4/6/2021       RE: Mariano Dey  [address] [place] [id number]     Dear Colleague,    Thank you for referring your patient, Mariano Dey, to the [facility] [place] at [facility]. Please see a copy of my visit note below.      Rheumatology Clinic Visit-remote     Mariano Dey MRN# [id number]   YOB: 1970 Age: 50 year old     Date of Visit: 04/06/2021  Primary care provider: Shilpa Guerra          Assessment and Plan:       # Polyarthralgia, +RF: Chronic, low-grade pain in knuckles and finger joints showed no response to prednisone, and intensity of pain is mild and is only disruptive to activities of daily living occasionally.  Video exam shows full range of motion and no visible swelling or asymmetry in the MCPs or PIPs.  Blood work on March 30, 2021 showed rheumatoid factor 86, CCP negative, sed rate of 10 and CRP of 4.3.  ELTON was negative.  Uric acid was 4.4.Chest x-ray was normal. Hand films on March 26, 2021 showed radial deviation of the distal fifth phalanx. No joint space narrowing or erosions.Apart from the positive rheumatoid factor, markers that help assess risk of progressive, damaging arthritis (inflammatory markers, cyclic citrullinated peptide antibodies) are negative.  X-rays show no inflammatory joint damage.     I think it premature to label the clinical scenario \"rheumatoid arthritis\" despite the presence of chronic, symmetrical small joint predominant pain and the positive rheumatoid factor..  There is some evidence of osteoarthritis, and the long-term prognosis is good based on the patient's lack of progression to erosive damaging disease in the 20 years since rheumatoid factor was first detected.  I think it reasonable to adopt a stance of \"watchful waiting\", where in therapy to suppress inflammation and joint damage can be put on hold and only be used when or if " "symptoms escalate.     # Single episode of severe great toe pain: The nature of the great toe pain episode strongly suggests gout or pseudogout as the cause.  There has been no recurrence thankfully.  I recommend an \"abortive\" approach to treating future probable gouty flares.  Uric acid lowering therapy taken chronically would be an approach to increasing frequency and severity of gouty flares.    # Abdominal pain: Intensity of the symptom has receded since the last visit.  Chest x-ray was clear.  Etiology of the pain remains obscure, but it is safe to monitor for now.    Plan:  1.  For chronic finger pain, monitor levels of morning stiffness, joint swelling, disruption in activities of daily living.  Use acetaminophen 500 to 1000 mg up to 3 times a day as needed for pain.  Notify rheumatology if joint pain increases.  2.  To prevent gout flares: At the first sign of a recurrent flare, take 20 mg of prednisone.  Take 20 mg of prednisone each day for the next 4 days, then stop.  Notify rheumatology after using a course of prednisone so that the medication can be refilled.    -Follow-up in 6 mos       I spent >50% of this 35 minute encounter in counseling and coordination of care regarding the patient's complex medical problem of chronic joint pain.    Paolo Trimble M.D.  Staff Rheumatologist, [facility]  Pager [phone]         Active Problem List:     Patient Active Problem List    Diagnosis Date Noted     Cholelithiasis 06/13/2013     Priority: Medium     Problem list name updated by automated process. Provider to review and confirm       Pancreatitis 05/18/2013     Priority: Medium     Migraines      Priority: Medium     Patient given Migraine Education folder and Migraine Action Plan on May 10, 2011.       RA (rheumatoid arthritis) (H)      Priority: Medium     CARDIOVASCULAR SCREENING; LDL GOAL LESS THAN 160 10/31/2010     Priority: Medium            History of Present Illness:   Mariano Dey presents for " "follow-up of joint pain.  She was last seen March 9, 2021.  Impression/plan at that time was \"Chronic symmetric polyarthralgia has features of inflammatory arthritis. To gauge aggressiveness of possible rheumatoid arthritis, measure inflammatory markers and autoimmune serologies. Trial low-dose prednisone. If prednisone-sensitive symptoms associate with erosive disease, plan combination therapy with methotrexate and sulfasalazine and hydroxychloroquine vs methotrexate plus anti-TNF.    Interval history 04-:    She took prednisone for 2 weeks after the last visit.. She noted no difference in either joint pain stiffness or swelling.  Now she reports \"2 out of 10\" intensity pain affecting primarily the knuckles and first row of finger joints in both hands.  There is minimal disruption of activities of daily living, including repetitive actions and grasping and lifting.  Morning stiffness is less dramatic than previously.  She has not moved to use nonsteroidals or over-the-counter remedies with any frequency.    Although she has not had any recurrence of right great toe swelling and pain since the last visit, she remains very interested in determining a plan for how to handle possible recurrence.  The severity of the pain was such that she would go to great lengths to avoid repeating the episode.    Hx 3-9-2021    Pt was diagnosed with RA when she was 23yo. She doesn't recall what prompted the workup or whether or not she saw a rheumatologist at the time. She thinks the diagnosis was made with lab work. She did not have significant symptoms related to RA, so she never followed up to make a treatment plan. Recently, she was seen in the emergency room in October 2020 with acute forefoot pain. She was diagnosed with gouty arthritis, although uric acid was normal at level of 4.0. Her pain improved with prednisone, however the episode was exquisitely painful and motivated her to create a plan for her RA. Pt also has " "undiagnosed upper abdominal pain that she wonders if it could be related to her RA. Her pain wraps around to her back along the bra line. It is a constant pressure but can become acute with a severity of 5-6/10. She has associated vomiting with episodes, but no diarrhea or constipation. She notes that this pain is similar to her previous episode of pancreatitis. She had a small recent weight loss (normally 180-210) related to healthier eating.     She also has chronic joint pain that is symmetric and worse in the PIP joints and wrists of her bilateral hands. She has some pain in her bilateral ankles and R 1st MTP. Pain is exacerbated when she bends her right toes, so she is forced to walk down stairs one at a time. Her joint pain and swelling are worst in the mornings and improve after several hours of activity. No paresthesias, CP, SOP, fevers/chills, or skin changes. Pt denies weakness, but recalls episodes where her hands \"choose not to hold something\". If she has a cup of coffee she may drop it, so she has to hold it with 2 hands. Joint pain does not wake her up at night.     Pt smokes 1/2 pack per day for the last 35 years. She considers herself a heavy drinker, but cut back her alcohol consumption 6 weeks ago. She now tries to drink 1-2 glasses a night with a max of 7 drinks in a week.           Review of Systems:       Constitutional: negative  Skin: negative  Eyes: negative  Ears/Nose/Throat: negative  Respiratory: No shortness of breath, dyspnea on exertion, cough, or hemoptysis  Cardiovascular: negative  Gastrointestinal: See HPI.  Genitourinary: negative  Musculoskeletal: negative  Neurologic: negative  Psychiatric: negative  Hematologic/Lymphatic/Immunologic: negative  Endocrine: negative          Past Medical History:     Past Medical History:   Diagnosis Date     HPV (human papillomavirus)      Migraines      Pancreatitis      RA (rheumatoid arthritis) (H)      Past Surgical History:   Procedure " Laterality Date     CHOLECYSTECTOMY  13     ESOPHAGOSCOPY, GASTROSCOPY, DUODENOSCOPY (EGD), COMBINED N/A 2021    Procedure: ESOPHAGOGASTRODUODENOSCOPY, WITH BIOPSY;  Surgeon: Bobby Duffy MD;  Location: [facility]     LAPAROSCOPIC CHOLECYSTECTOMY  2013    Procedure: LAPAROSCOPIC CHOLECYSTECTOMY;  Laparoscopic Cholecystetomy;  Surgeon: Joshua Quiñonez MD;  Location: [facility]            Social History:     Social History     Occupational History     Not on file   Tobacco Use     Smoking status: Current Every Day Smoker     Packs/day: 1.00     Years: 30.00     Pack years: 30.00     Types: Cigarettes     Last attempt to quit: 2009     Years since quittin.1     Smokeless tobacco: Never Used   Substance and Sexual Activity     Alcohol use: Yes     Frequency: Monthly or less     Comment: 6 beers a week      Drug use: No     Sexual activity: Not Currently     Partners: Male     Birth control/protection: None     Heavy alcohol use: 3-2021 7 drinks per week.   Smokes 1/2 ppd       Family History:     Family history is significant for RA, COPD, lung cancer, and breast cancer in her mom and maternal grandma. Her dad  in his 50s from pancreatic cancer.     Family History   Problem Relation Age of Onset     Cancer Mother      Other Cancer Mother         Lung     Substance Abuse Mother         Alcohol     Cancer Father      Breast Cancer Maternal Grandmother      Cardiovascular Maternal Grandmother      C.A.D. Maternal Grandfather             Allergies:     Allergies   Allergen Reactions     Nkda [No Known Drug Allergies]             Medications:     No current outpatient medications on file.            Physical Exam:   Last menstrual period 2015, not currently breastfeeding.  Wt Readings from Last 4 Encounters:   21 94.1 kg (207 lb 6.4 oz)   21 91.6 kg (202 lb)   21 94.3 kg (208 lb)   10/20/20 90.7 kg (200 lb)     Physical Exam was performed over video visit.   Constitutional:  well-developed, appearing stated age; cooperative  Eyes: nl EOM, PERRLA, vision, conjunctiva, sclera  ENT: nl external ears, nose, hearing, lips  Neck: no obvious mass or thyroid enlargement  Resp/CV: breathing unlabored on RA  GI: Abd tenderness per pt exam  : not tested  MS: The wrist, MCP/PIP/DIP were examined. Bilateral swelling visible at all PIP joints bilaterally. Pt is able to make normal fist bilaterally. Nl ROM at the wrist bilaterally. No active synovitis or altered joint anatomy. Full joint ROM. Normal  strength. No dactylitis,  tenosynovitis, enthesopathy.  Skin: no nail pitting, alopecia, rash, nodules or lesions   Psych: nl judgement, orientation, affect.         Data:     No results found for any visits on 04/06/21.  RHEUM RESULTS Latest Ref Rng & Units 6/29/2017 2/5/2021 3/30/2021   SED RATE 0 - 30 mm/h - - 10   CRP, INFLAMMATION 0.0 - 8.0 mg/L - - 4.3   RHEUMATOID FACTOR <12 IU/mL - - 86(H)   AST 0 - 45 U/L 23 22 -   ALT 0 - 50 U/L 29 35 -   ALBUMIN 3.4 - 5.0 g/dL 3.7 3.8 -   WBC 4.0 - 11.0 10e9/L 11.2(H) 8.4 -   RBC 3.8 - 5.2 10e12/L 4.38 4.52 -   HGB 11.7 - 15.7 g/dL 13.7 14.3 -   HCT 35.0 - 47.0 % 41.8 42.8 -   MCV 78 - 100 fl 95 95 -   MCHC 31.5 - 36.5 g/dL 32.8 33.4 -   RDW 10.0 - 15.0 % 12.9 12.4 -    - 450 10e9/L 257 287 -   CREATININE 0.52 - 1.04 mg/dL 0.88 0.88 -   GFR ESTIMATE, IF BLACK >60 mL/min/[1.73:m2] 83 88 -   GFR ESTIMATE >60 mL/min/[1.73:m2] 69 76 -       Rheumatoid Factor   Date Value Ref Range Status   03/30/2021 86 (H) <12 IU/mL Final   ,   Cyclic Citrullinated Peptide Antibody, IgG   Date Value Ref Range Status   03/30/2021 1 <7 U/mL Final     Comment:     Negative   ,  ,  ,  ,  ,   ELTON interpretation   Date Value Ref Range Status   03/30/2021 Negative NEG^Negative Final     Comment:                                        Reference range:  <1:40  NEGATIVE  1:40 - 1:80  BORDERLINE POSITIVE  >1:80 POSITIVE       Mariano is a 50 year old who is being evaluated via a  billable video visit.      How would you like to obtain your AVS? MyChart    Will anyone else be joining your video visit? No      Video Start Time: 3:19 PM  Video-Visit Details    Type of service:  Video Visit    Video End Time:4:05 PM    Originating Location (pt. Location): Home    Distant Location (provider location):  [facility] [place]     Platform used for Video Visit: Alexi        Again, thank you for allowing me to participate in the care of your patient.      Sincerely,    Paolo Trimble MD

## 2021-04-06 NOTE — PATIENT INSTRUCTIONS
"Diagnosis:  1.  Chronic, low-grade pain in knuckles and finger joints: Response to prednisone was underwhelming, but intensity of pain is mild and is only disruptive to activities of daily living occasionally.  Apart from the positive rheumatoid factor, markers that help assess risk of progressive damaging arthritis (inflammatory markers, cyclic citrullinated peptide antibodies) are negative.  X-rays show no joint damage.  I think it reasonable to adopt a stance of \"watchful waiting\", where in therapy to suppress inflammation and joint damage can be put on hold and only be used when or if symptoms escalate.  2.  Severe episode of great toe pain: The nature of the great toe pain episode strongly suggests gout or pseudogout as the cause.  There has been no recurrence thankfully.  I recommend an \"abortive\" approach to treating future probable gouty flares.  Uric acid lowering therapy taken chronically would be an approach to increasing frequency and severity of gouty flares.    Plan:  1.  For chronic finger pain, monitor levels of morning stiffness, joint swelling, disruption in activities of daily living.  Use acetaminophen 500 to 1000 mg up to 3 times a day as needed for pain.  Notify rheumatology if joint pain increases.  2.  To prevent gout flares: At the first sign of a recurrent flare, take 20 mg of prednisone.  Take 20 mg of prednisone each day for the next 4 days, then stop.  Notify rheumatology after using a course of prednisone so that the medication can be refilled.  "

## 2021-04-12 ENCOUNTER — TELEPHONE (OUTPATIENT)
Dept: RHEUMATOLOGY | Facility: CLINIC | Age: 51
End: 2021-04-12

## 2021-04-13 DIAGNOSIS — M06.9 RHEUMATOID ARTHRITIS INVOLVING MULTIPLE SITES, UNSPECIFIED WHETHER RHEUMATOID FACTOR PRESENT (H): Primary | ICD-10-CM

## 2021-04-13 NOTE — TELEPHONE ENCOUNTER
[facility] Call Center    Phone Message    May a detailed message be left on voicemail: yes     Reason for Call: Other: Patient saw Dr. Trimble 4/6 and he was going to send an rx for prednisone to her pharmacy. The pharmacy states they do not have it. Pharmacy is [facility] in [place] on [address].      Action Taken: Message routed to:  [facility] ([facility] Rheum    Travel Screening: Not Applicable

## 2021-04-14 RX ORDER — PREDNISONE 10 MG/1
TABLET ORAL
Qty: 30 TABLET | Refills: 0 | Status: ON HOLD | OUTPATIENT
Start: 2021-04-14 | End: 2022-10-14

## 2021-05-17 ENCOUNTER — IMMUNIZATION (OUTPATIENT)
Dept: FAMILY MEDICINE | Facility: CLINIC | Age: 51
End: 2021-05-17
Payer: COMMERCIAL

## 2021-05-17 ENCOUNTER — HOSPITAL ENCOUNTER (OUTPATIENT)
Dept: MAMMOGRAPHY | Facility: CLINIC | Age: 51
Discharge: HOME | End: 2021-05-17
Attending: FAMILY MEDICINE | Admitting: NURSE PRACTITIONER
Payer: COMMERCIAL

## 2021-05-17 DIAGNOSIS — Z12.31 VISIT FOR SCREENING MAMMOGRAM: ICD-10-CM

## 2021-05-17 PROCEDURE — 91301 PR COVID VAC MODERNA 100 MCG/0.5 ML IM: CPT

## 2021-05-17 PROCEDURE — 0011A PR COVID VAC MODERNA 100 MCG/0.5 ML IM: CPT

## 2021-05-17 PROCEDURE — 77063 BREAST TOMOSYNTHESIS BI: CPT

## 2021-06-16 ENCOUNTER — IMMUNIZATION (OUTPATIENT)
Dept: FAMILY MEDICINE | Facility: CLINIC | Age: 51
End: 2021-06-16
Attending: FAMILY MEDICINE
Payer: COMMERCIAL

## 2021-06-16 PROCEDURE — 91301 PR COVID VAC MODERNA 100 MCG/0.5 ML IM: CPT

## 2021-06-16 PROCEDURE — 0012A PR COVID VAC MODERNA 100 MCG/0.5 ML IM: CPT

## 2021-08-02 ENCOUNTER — MYC MEDICAL ADVICE (OUTPATIENT)
Dept: FAMILY MEDICINE | Facility: CLINIC | Age: 51
End: 2021-08-02

## 2021-08-02 DIAGNOSIS — Z20.822 EXPOSURE TO COVID-19 VIRUS: Primary | ICD-10-CM

## 2021-08-02 NOTE — TELEPHONE ENCOUNTER
Routed to provider.  Please see message from pt in Bl[employer] for iPhone and Androidhart.  Thank you.    Can Covid test be ordered?    Clemencia Patrick RN

## 2021-08-05 DIAGNOSIS — Z20.822 EXPOSURE TO COVID-19 VIRUS: ICD-10-CM

## 2021-08-05 PROCEDURE — U0005 INFEC AGEN DETEC AMPLI PROBE: HCPCS

## 2021-08-05 PROCEDURE — U0003 INFECTIOUS AGENT DETECTION BY NUCLEIC ACID (DNA OR RNA); SEVERE ACUTE RESPIRATORY SYNDROME CORONAVIRUS 2 (SARS-COV-2) (CORONAVIRUS DISEASE [COVID-19]), AMPLIFIED PROBE TECHNIQUE, MAKING USE OF HIGH THROUGHPUT TECHNOLOGIES AS DESCRIBED BY CMS-2020-01-R: HCPCS

## 2021-08-06 LAB — SARS-COV-2 RNA RESP QL NAA+PROBE: NEGATIVE

## 2021-08-23 ENCOUNTER — OFFICE VISIT (OUTPATIENT)
Dept: FAMILY MEDICINE | Facility: CLINIC | Age: 51
End: 2021-08-23
Payer: COMMERCIAL

## 2021-08-23 VITALS
HEART RATE: 78 BPM | HEIGHT: 68 IN | RESPIRATION RATE: 16 BRPM | WEIGHT: 203.4 LBS | DIASTOLIC BLOOD PRESSURE: 72 MMHG | TEMPERATURE: 98 F | OXYGEN SATURATION: 98 % | BODY MASS INDEX: 30.83 KG/M2 | SYSTOLIC BLOOD PRESSURE: 128 MMHG

## 2021-08-23 DIAGNOSIS — H65.91 MEE (MIDDLE EAR EFFUSION), RIGHT: Primary | ICD-10-CM

## 2021-08-23 PROCEDURE — 99213 OFFICE O/P EST LOW 20 MIN: CPT | Performed by: NURSE PRACTITIONER

## 2021-08-23 ASSESSMENT — MIFFLIN-ST. JEOR: SCORE: 1579.12

## 2021-08-23 ASSESSMENT — PAIN SCALES - GENERAL: PAINLEVEL: NO PAIN (0)

## 2021-08-23 NOTE — PROGRESS NOTES
"    Assessment & Plan     SHERYL (middle ear effusion), right  Advised to try Sudafed, will resolve over time. Follow up if not improving.       BMI:   Estimated body mass index is 31.33 kg/m  as calculated from the following:    Height as of this encounter: 1.716 m (5' 7.56\").    Weight as of this encounter: 92.3 kg (203 lb 6.4 oz).       Patient Instructions   Try some Sudafed.  Let me know if not improving.      Return in about 1 week (around 8/30/2021) for worsening or continued symptoms.    MONICA Perez CNP  [facility] ELLIOT Quintana is a 50 year old who presents for the following health issues     HPI     Acute Illness  Acute illness concerns: 8/22/21  Onset/Duration: 2 days  Symptoms:  Dizziness: yes  Fever: no  Chills/Sweats: no  Headache (location?): YES  Sinus Pressure: no  Conjunctivitis:  no  Ear Pain: YES: both  Rhinorrhea: no  Congestion: no  Sore Throat: no  Cough: no  Wheeze: no  Decreased Appetite: no  Nausea: no  Vomiting: no  Diarrhea: no  Dysuria/Freq.: no  Dysuria or Hematuria: no  Fatigue/Achiness: no  Sick/Strep Exposure: no  Therapies tried and outcome: None    Above HPI reviewed. Additionally, improved today. Was worsen yesterday. Right ear pressure. No fever. Mild congestion. No cough or fever.        Review of Systems   Constitutional, HEENT, cardiovascular, pulmonary, gi and gu systems are negative, except as otherwise noted.      Objective    /72 (BP Location: Left arm, Patient Position: Sitting, Cuff Size: Adult Regular)   Pulse 78   Temp 98  F (36.7  C) (Tympanic)   Resp 16   Ht 1.716 m (5' 7.56\")   Wt 92.3 kg (203 lb 6.4 oz)   LMP 07/16/2015   SpO2 98%   Breastfeeding No   BMI 31.33 kg/m    Body mass index is 31.33 kg/m .  Physical Exam  Vitals and nursing note reviewed.   Constitutional:       Appearance: Normal appearance.   HENT:      Head: Normocephalic and atraumatic.      Right Ear: Ear canal normal. A middle ear effusion is " present.      Left Ear: Tympanic membrane and ear canal normal.      Nose: Nose normal. No rhinorrhea.      Right Sinus: No maxillary sinus tenderness or frontal sinus tenderness.      Left Sinus: No maxillary sinus tenderness or frontal sinus tenderness.      Mouth/Throat:      Lips: Pink.      Mouth: Mucous membranes are moist.      Pharynx: Oropharynx is clear.   Eyes:      General: Lids are normal.      Conjunctiva/sclera: Conjunctivae normal.      Comments: Non icteric   Cardiovascular:      Rate and Rhythm: Normal rate.      Pulses: Normal pulses.      Heart sounds: S1 normal and S2 normal.   Pulmonary:      Effort: Pulmonary effort is normal.      Breath sounds: Normal air entry.   Musculoskeletal:      Cervical back: Neck supple.   Lymphadenopathy:      Cervical: No cervical adenopathy.   Skin:     General: Skin is warm and dry.   Neurological:      General: No focal deficit present.      Mental Status: She is alert and oriented to person, place, and time.   Psychiatric:         Mood and Affect: Mood normal.         Behavior: Behavior normal.         Thought Content: Thought content normal.         Judgment: Judgment normal.

## 2021-09-26 ENCOUNTER — HEALTH MAINTENANCE LETTER (OUTPATIENT)
Age: 51
End: 2021-09-26

## 2022-01-08 ENCOUNTER — MYC MEDICAL ADVICE (OUTPATIENT)
Dept: FAMILY MEDICINE | Facility: CLINIC | Age: 52
End: 2022-01-08
Payer: COMMERCIAL

## 2022-01-19 ENCOUNTER — ALLIED HEALTH/NURSE VISIT (OUTPATIENT)
Dept: FAMILY MEDICINE | Facility: CLINIC | Age: 52
End: 2022-01-19
Payer: COMMERCIAL

## 2022-01-19 DIAGNOSIS — Z23 NEED FOR VACCINATION: Primary | ICD-10-CM

## 2022-01-19 PROCEDURE — 90471 IMMUNIZATION ADMIN: CPT

## 2022-01-19 PROCEDURE — 90750 HZV VACC RECOMBINANT IM: CPT

## 2022-01-19 PROCEDURE — 99207 PR NO CHARGE NURSE ONLY: CPT

## 2022-01-19 NOTE — NURSING NOTE
Prior to immunization administration, verified patients identity using patient s name and date of birth. Please see Immunization Activity for additional information.     Screening Questionnaire for Adult Immunization    Are you sick today?   No   Do you have allergies to medications, food, a vaccine component or latex?   No   Have you ever had a serious reaction after receiving a vaccination?   No   Do you have a long-term health problem with heart, lung, kidney, or metabolic disease (e.g., diabetes), asthma, a blood disorder, no spleen, complement component deficiency, a cochlear implant, or a spinal fluid leak?  Are you on long-term aspirin therapy?   No   Do you have cancer, leukemia, HIV/AIDS, or any other immune system problem?   No   Do you have a parent, brother, or sister with an immune system problem?   No   In the past 3 months, have you taken medications that affect  your immune system, such as prednisone, other steroids, or anticancer drugs; drugs for the treatment of rheumatoid arthritis, Crohn s disease, or psoriasis; or have you had radiation treatments?   No   Have you had a seizure, or a brain or other nervous system problem?   No   During the past year, have you received a transfusion of blood or blood    products, or been given immune (gamma) globulin or antiviral drug?   No   For women: Are you pregnant or is there a chance you could become       pregnant during the next month?   No   Have you received any vaccinations in the past 4 weeks?   Yes     Immunization questionnaire was positive for at least one answer.  Not contraindicated to vaccine given.        Per orders of Dr. Vera, injection of shingrix given by Samanta Espinoza CMA. Patient instructed to remain in clinic for 15 minutes afterwards, and to report any adverse reaction to me immediately.       Screening performed by Samanta Espinoza CMA on 1/19/2022 at 3:04 PM.

## 2022-05-08 ENCOUNTER — HEALTH MAINTENANCE LETTER (OUTPATIENT)
Age: 52
End: 2022-05-08

## 2022-08-28 ENCOUNTER — HEALTH MAINTENANCE LETTER (OUTPATIENT)
Age: 52
End: 2022-08-28

## 2022-09-14 ENCOUNTER — HOSPITAL ENCOUNTER (OUTPATIENT)
Dept: MAMMOGRAPHY | Facility: CLINIC | Age: 52
Discharge: HOME | End: 2022-09-14
Attending: FAMILY MEDICINE | Admitting: FAMILY MEDICINE
Payer: COMMERCIAL

## 2022-09-14 DIAGNOSIS — Z12.31 VISIT FOR SCREENING MAMMOGRAM: ICD-10-CM

## 2022-09-14 PROCEDURE — 77067 SCR MAMMO BI INCL CAD: CPT

## 2022-10-10 ENCOUNTER — APPOINTMENT (OUTPATIENT)
Dept: CT IMAGING | Facility: CLINIC | Age: 52
End: 2022-10-10
Attending: FAMILY MEDICINE
Payer: COMMERCIAL

## 2022-10-10 ENCOUNTER — HOSPITAL ENCOUNTER (INPATIENT)
Facility: CLINIC | Age: 52
LOS: 3 days | Discharge: HOME | End: 2022-10-14
Attending: FAMILY MEDICINE | Admitting: INTERNAL MEDICINE
Payer: COMMERCIAL

## 2022-10-10 DIAGNOSIS — Z11.52 ENCOUNTER FOR SCREENING LABORATORY TESTING FOR SEVERE ACUTE RESPIRATORY SYNDROME CORONAVIRUS 2 (SARS-COV-2): ICD-10-CM

## 2022-10-10 DIAGNOSIS — K85.90 ACUTE PANCREATITIS, UNSPECIFIED COMPLICATION STATUS, UNSPECIFIED PANCREATITIS TYPE: ICD-10-CM

## 2022-10-10 PROBLEM — Z90.49 S/P CHOLECYSTECTOMY: Status: ACTIVE | Noted: 2022-10-10

## 2022-10-10 LAB
ALBUMIN SERPL BCG-MCNC: 3.9 G/DL (ref 3.5–5.2)
ALP SERPL-CCNC: 112 U/L (ref 35–104)
ALT SERPL W P-5'-P-CCNC: 49 U/L (ref 10–35)
ANION GAP SERPL CALCULATED.3IONS-SCNC: 16 MMOL/L (ref 7–15)
AST SERPL W P-5'-P-CCNC: 27 U/L (ref 10–35)
BASE EXCESS BLDV CALC-SCNC: 1.3 MMOL/L (ref -7.7–1.9)
BASOPHILS # BLD AUTO: 0.1 10E3/UL (ref 0–0.2)
BASOPHILS NFR BLD AUTO: 0 %
BILIRUB SERPL-MCNC: 0.7 MG/DL
BUN SERPL-MCNC: 9.9 MG/DL (ref 6–20)
CALCIUM SERPL-MCNC: 9.1 MG/DL (ref 8.6–10)
CHLORIDE SERPL-SCNC: 96 MMOL/L (ref 98–107)
CHOLEST SERPL-MCNC: 177 MG/DL
CREAT SERPL-MCNC: 0.77 MG/DL (ref 0.51–0.95)
CRP SERPL-MCNC: 215.07 MG/L
DEPRECATED HCO3 PLAS-SCNC: 21 MMOL/L (ref 22–29)
EOSINOPHIL # BLD AUTO: 0.1 10E3/UL (ref 0–0.7)
EOSINOPHIL NFR BLD AUTO: 0 %
ERYTHROCYTE [DISTWIDTH] IN BLOOD BY AUTOMATED COUNT: 12 % (ref 10–15)
FLUAV RNA SPEC QL NAA+PROBE: NEGATIVE
FLUBV RNA RESP QL NAA+PROBE: NEGATIVE
GFR SERPL CREATININE-BSD FRML MDRD: >90 ML/MIN/1.73M2
GLUCOSE SERPL-MCNC: 95 MG/DL (ref 70–99)
HCO3 BLDV-SCNC: 26 MMOL/L (ref 21–28)
HCT VFR BLD AUTO: 42.9 % (ref 35–47)
HDLC SERPL-MCNC: 41 MG/DL
HGB BLD-MCNC: 14.8 G/DL (ref 11.7–15.7)
HOLD SPECIMEN: NORMAL
IMM GRANULOCYTES # BLD: 0.1 10E3/UL
IMM GRANULOCYTES NFR BLD: 1 %
LDLC SERPL CALC-MCNC: 113 MG/DL
LIPASE SERPL-CCNC: 77 U/L (ref 13–60)
LYMPHOCYTES # BLD AUTO: 2.2 10E3/UL (ref 0.8–5.3)
LYMPHOCYTES NFR BLD AUTO: 11 %
MCH RBC QN AUTO: 31.5 PG (ref 26.5–33)
MCHC RBC AUTO-ENTMCNC: 34.5 G/DL (ref 31.5–36.5)
MCV RBC AUTO: 91 FL (ref 78–100)
MONOCYTES # BLD AUTO: 1.4 10E3/UL (ref 0–1.3)
MONOCYTES NFR BLD AUTO: 7 %
NEUTROPHILS # BLD AUTO: 15.4 10E3/UL (ref 1.6–8.3)
NEUTROPHILS NFR BLD AUTO: 81 %
NONHDLC SERPL-MCNC: 136 MG/DL
NRBC # BLD AUTO: 0 10E3/UL
NRBC BLD AUTO-RTO: 0 /100
NT-PROBNP SERPL-MCNC: 113 PG/ML (ref 0–900)
O2/TOTAL GAS SETTING VFR VENT: 0 %
PCO2 BLDV: 38 MM HG (ref 40–50)
PH BLDV: 7.43 [PH] (ref 7.32–7.43)
PLATELET # BLD AUTO: 240 10E3/UL (ref 150–450)
PO2 BLDV: 45 MM HG (ref 25–47)
POTASSIUM SERPL-SCNC: 4 MMOL/L (ref 3.4–5.3)
PROT SERPL-MCNC: 7.5 G/DL (ref 6.4–8.3)
RBC # BLD AUTO: 4.7 10E6/UL (ref 3.8–5.2)
SARS-COV-2 RNA RESP QL NAA+PROBE: NEGATIVE
SODIUM SERPL-SCNC: 133 MMOL/L (ref 136–145)
TRIGL SERPL-MCNC: 113 MG/DL
TROPONIN T SERPL HS-MCNC: 7 NG/L
WBC # BLD AUTO: 19.2 10E3/UL (ref 4–11)

## 2022-10-10 PROCEDURE — 96376 TX/PRO/DX INJ SAME DRUG ADON: CPT | Performed by: FAMILY MEDICINE

## 2022-10-10 PROCEDURE — 83690 ASSAY OF LIPASE: CPT | Performed by: FAMILY MEDICINE

## 2022-10-10 PROCEDURE — 96376 TX/PRO/DX INJ SAME DRUG ADON: CPT

## 2022-10-10 PROCEDURE — G0378 HOSPITAL OBSERVATION PER HR: HCPCS

## 2022-10-10 PROCEDURE — 80053 COMPREHEN METABOLIC PANEL: CPT | Performed by: FAMILY MEDICINE

## 2022-10-10 PROCEDURE — 83880 ASSAY OF NATRIURETIC PEPTIDE: CPT | Performed by: FAMILY MEDICINE

## 2022-10-10 PROCEDURE — 74177 CT ABD & PELVIS W/CONTRAST: CPT

## 2022-10-10 PROCEDURE — 99223 1ST HOSP IP/OBS HIGH 75: CPT | Mod: AI

## 2022-10-10 PROCEDURE — 36415 COLL VENOUS BLD VENIPUNCTURE: CPT | Performed by: FAMILY MEDICINE

## 2022-10-10 PROCEDURE — 80061 LIPID PANEL: CPT

## 2022-10-10 PROCEDURE — 86140 C-REACTIVE PROTEIN: CPT | Performed by: FAMILY MEDICINE

## 2022-10-10 PROCEDURE — 250N000009 HC RX 250: Performed by: FAMILY MEDICINE

## 2022-10-10 PROCEDURE — 87636 SARSCOV2 & INF A&B AMP PRB: CPT | Performed by: FAMILY MEDICINE

## 2022-10-10 PROCEDURE — 84484 ASSAY OF TROPONIN QUANT: CPT | Performed by: FAMILY MEDICINE

## 2022-10-10 PROCEDURE — 82803 BLOOD GASES ANY COMBINATION: CPT | Performed by: FAMILY MEDICINE

## 2022-10-10 PROCEDURE — 250N000013 HC RX MED GY IP 250 OP 250 PS 637: Performed by: FAMILY MEDICINE

## 2022-10-10 PROCEDURE — 250N000011 HC RX IP 250 OP 636: Performed by: FAMILY MEDICINE

## 2022-10-10 PROCEDURE — 96375 TX/PRO/DX INJ NEW DRUG ADDON: CPT | Performed by: FAMILY MEDICINE

## 2022-10-10 PROCEDURE — 250N000011 HC RX IP 250 OP 636

## 2022-10-10 PROCEDURE — C9803 HOPD COVID-19 SPEC COLLECT: HCPCS | Performed by: FAMILY MEDICINE

## 2022-10-10 PROCEDURE — 99285 EMERGENCY DEPT VISIT HI MDM: CPT | Mod: 25 | Performed by: FAMILY MEDICINE

## 2022-10-10 PROCEDURE — 258N000003 HC RX IP 258 OP 636

## 2022-10-10 PROCEDURE — 85025 COMPLETE CBC W/AUTO DIFF WBC: CPT | Performed by: FAMILY MEDICINE

## 2022-10-10 PROCEDURE — 96374 THER/PROPH/DIAG INJ IV PUSH: CPT | Mod: 59 | Performed by: FAMILY MEDICINE

## 2022-10-10 PROCEDURE — C9113 INJ PANTOPRAZOLE SODIUM, VIA: HCPCS | Performed by: FAMILY MEDICINE

## 2022-10-10 PROCEDURE — 93010 ELECTROCARDIOGRAM REPORT: CPT | Performed by: FAMILY MEDICINE

## 2022-10-10 PROCEDURE — 258N000003 HC RX IP 258 OP 636: Performed by: FAMILY MEDICINE

## 2022-10-10 PROCEDURE — 93005 ELECTROCARDIOGRAM TRACING: CPT | Performed by: FAMILY MEDICINE

## 2022-10-10 RX ORDER — NALOXONE HYDROCHLORIDE 0.4 MG/ML
0.2 INJECTION, SOLUTION INTRAMUSCULAR; INTRAVENOUS; SUBCUTANEOUS
Status: DISCONTINUED | OUTPATIENT
Start: 2022-10-10 | End: 2022-10-14 | Stop reason: HOSPADM

## 2022-10-10 RX ORDER — HYDROMORPHONE HYDROCHLORIDE 1 MG/ML
0.5 INJECTION, SOLUTION INTRAMUSCULAR; INTRAVENOUS; SUBCUTANEOUS ONCE
Status: COMPLETED | OUTPATIENT
Start: 2022-10-10 | End: 2022-10-10

## 2022-10-10 RX ORDER — ONDANSETRON 2 MG/ML
4 INJECTION INTRAMUSCULAR; INTRAVENOUS ONCE
Status: DISCONTINUED | OUTPATIENT
Start: 2022-10-10 | End: 2022-10-10

## 2022-10-10 RX ORDER — ONDANSETRON 4 MG/1
4 TABLET, ORALLY DISINTEGRATING ORAL EVERY 6 HOURS PRN
Status: DISCONTINUED | OUTPATIENT
Start: 2022-10-10 | End: 2022-10-14 | Stop reason: HOSPADM

## 2022-10-10 RX ORDER — NALOXONE HYDROCHLORIDE 0.4 MG/ML
0.4 INJECTION, SOLUTION INTRAMUSCULAR; INTRAVENOUS; SUBCUTANEOUS
Status: DISCONTINUED | OUTPATIENT
Start: 2022-10-10 | End: 2022-10-14 | Stop reason: HOSPADM

## 2022-10-10 RX ORDER — ONDANSETRON 2 MG/ML
4 INJECTION INTRAMUSCULAR; INTRAVENOUS ONCE
Status: COMPLETED | OUTPATIENT
Start: 2022-10-10 | End: 2022-10-10

## 2022-10-10 RX ORDER — ONDANSETRON 2 MG/ML
4 INJECTION INTRAMUSCULAR; INTRAVENOUS EVERY 6 HOURS PRN
Status: DISCONTINUED | OUTPATIENT
Start: 2022-10-10 | End: 2022-10-14 | Stop reason: HOSPADM

## 2022-10-10 RX ORDER — HYDROMORPHONE HYDROCHLORIDE 1 MG/ML
0.5 INJECTION, SOLUTION INTRAMUSCULAR; INTRAVENOUS; SUBCUTANEOUS
Status: DISCONTINUED | OUTPATIENT
Start: 2022-10-10 | End: 2022-10-10

## 2022-10-10 RX ORDER — IOPAMIDOL 755 MG/ML
100 INJECTION, SOLUTION INTRAVASCULAR ONCE
Status: COMPLETED | OUTPATIENT
Start: 2022-10-10 | End: 2022-10-10

## 2022-10-10 RX ORDER — SODIUM CHLORIDE, SODIUM LACTATE, POTASSIUM CHLORIDE, CALCIUM CHLORIDE 600; 310; 30; 20 MG/100ML; MG/100ML; MG/100ML; MG/100ML
1000 INJECTION, SOLUTION INTRAVENOUS CONTINUOUS
Status: DISCONTINUED | OUTPATIENT
Start: 2022-10-10 | End: 2022-10-14

## 2022-10-10 RX ORDER — HYDROMORPHONE HYDROCHLORIDE 1 MG/ML
.3-.5 INJECTION, SOLUTION INTRAMUSCULAR; INTRAVENOUS; SUBCUTANEOUS
Status: DISCONTINUED | OUTPATIENT
Start: 2022-10-10 | End: 2022-10-14

## 2022-10-10 RX ADMIN — LIDOCAINE HYDROCHLORIDE 30 ML: 20 SOLUTION ORAL; TOPICAL at 10:59

## 2022-10-10 RX ADMIN — SODIUM CHLORIDE, POTASSIUM CHLORIDE, SODIUM LACTATE AND CALCIUM CHLORIDE 1000 ML: 600; 310; 30; 20 INJECTION, SOLUTION INTRAVENOUS at 11:46

## 2022-10-10 RX ADMIN — HYDROMORPHONE HYDROCHLORIDE 0.5 MG: 1 INJECTION, SOLUTION INTRAMUSCULAR; INTRAVENOUS; SUBCUTANEOUS at 19:52

## 2022-10-10 RX ADMIN — SODIUM CHLORIDE 66 ML: 9 INJECTION, SOLUTION INTRAVENOUS at 11:58

## 2022-10-10 RX ADMIN — ONDANSETRON 4 MG: 2 INJECTION INTRAMUSCULAR; INTRAVENOUS at 10:54

## 2022-10-10 RX ADMIN — HYDROMORPHONE HYDROCHLORIDE 0.5 MG: 1 INJECTION, SOLUTION INTRAMUSCULAR; INTRAVENOUS; SUBCUTANEOUS at 22:04

## 2022-10-10 RX ADMIN — PANTOPRAZOLE SODIUM 40 MG: 40 INJECTION, POWDER, FOR SOLUTION INTRAVENOUS at 10:56

## 2022-10-10 RX ADMIN — IOPAMIDOL 100 ML: 755 INJECTION, SOLUTION INTRAVENOUS at 11:58

## 2022-10-10 RX ADMIN — HYDROMORPHONE HYDROCHLORIDE 0.5 MG: 1 INJECTION, SOLUTION INTRAMUSCULAR; INTRAVENOUS; SUBCUTANEOUS at 14:57

## 2022-10-10 RX ADMIN — HYDROMORPHONE HYDROCHLORIDE 0.5 MG: 1 INJECTION, SOLUTION INTRAMUSCULAR; INTRAVENOUS; SUBCUTANEOUS at 11:49

## 2022-10-10 RX ADMIN — SODIUM CHLORIDE, POTASSIUM CHLORIDE, SODIUM LACTATE AND CALCIUM CHLORIDE 1000 ML: 600; 310; 30; 20 INJECTION, SOLUTION INTRAVENOUS at 19:52

## 2022-10-10 RX ADMIN — HYDROMORPHONE HYDROCHLORIDE 0.5 MG: 1 INJECTION, SOLUTION INTRAMUSCULAR; INTRAVENOUS; SUBCUTANEOUS at 17:44

## 2022-10-10 ASSESSMENT — ACTIVITIES OF DAILY LIVING (ADL)
ADLS_ACUITY_SCORE: 37
ADLS_ACUITY_SCORE: 20

## 2022-10-10 NOTE — ED PROVIDER NOTES
"  History     Chief Complaint   Patient presents with     Abdominal Pain     Abdominal pain, N/V, and SOA started Friday night     HPI  Mariano Dey is a 51 year old female, past medical history is significant for cholelithiasis status postcholecystectomy in 2013, associated gallstone pancreatitis, migraine, rheumatoid arthritis, presents to the emergency department with concerns of abdominal pain, nausea, vomiting, shortness of air beginning 3 days prior to presentation.  History is obtained from the patient who identifies epigastric area abdominal pain with radiation down to the umbilicus but no penetration through the back or flanks beginning 3 days prior to presentation after she ate pizza and beer.  The pain has been persistent and never absent through the course of the weekend although it does get noticeably worse when she eats something specifically any red sauces like pizza lasagna or pasta.  She notes anorexia over the weekend, nausea no vomiting.  No change in bowel habits and denies constipation or diarrhea, no black or bloody appearing stool.  No urinary tract symptoms.  Pain is somewhat similar to what she is experienced with cholelithiasis and biliary colic prior to her laparoscopic cholecystectomy in 2013.  She had upper GI endoscopy for some milder similar symptoms last year and states that her EGD with Dr. Duffy was \"normal\".  I reviewed this in the medical record from 2/19/2021 and the report does note duodenitis present.  No specific recommendations.  The patient has not been on any type of PPI or otherwise gastroprotective medication.  She has not tried any medication for the discomfort.  There does not appear to be any association with exertion she does identify some chest tightness.  Additionally, the patient has not had any other alcoholic beverage since the one mentioned that initiated the episode along with pizza 3 days prior to presentation.    Allergies:  Allergies   Allergen Reactions "     Nkda [No Known Drug Allergies]        Problem List:    Patient Active Problem List    Diagnosis Date Noted     S/P cholecystectomy 10/10/2022     Priority: Medium     Cholelithiasis 06/13/2013     Priority: Medium     Problem list name updated by automated process. Provider to review and confirm       Pancreatitis 05/18/2013     Priority: Medium     Migraines      Priority: Medium     Patient given Migraine Education folder and Migraine Action Plan on May 10, 2011.       RA (rheumatoid arthritis) (H)      Priority: Medium     CARDIOVASCULAR SCREENING; LDL GOAL LESS THAN 160 10/31/2010     Priority: Medium        Past Medical History:    Past Medical History:   Diagnosis Date     HPV (human papillomavirus)      Migraines      Pancreatitis      RA (rheumatoid arthritis) (H)        Past Surgical History:    Past Surgical History:   Procedure Laterality Date     CHOLECYSTECTOMY  6/13/13     ESOPHAGOSCOPY, GASTROSCOPY, DUODENOSCOPY (EGD), COMBINED N/A 2/19/2021    Procedure: ESOPHAGOGASTRODUODENOSCOPY, WITH BIOPSY;  Surgeon: Bobby Duffy MD;  Location: [facility]     LAPAROSCOPIC CHOLECYSTECTOMY  6/13/2013    Procedure: LAPAROSCOPIC CHOLECYSTECTOMY;  Laparoscopic Cholecystetomy;  Surgeon: Joshua Quiñonez MD;  Location: [facility]       Family History:    Family History   Problem Relation Age of Onset     Cancer Mother      Other Cancer Mother         Lung     Substance Abuse Mother         Alcohol     Cancer Father      Breast Cancer Maternal Grandmother      Cardiovascular Maternal Grandmother      C.A.D. Maternal Grandfather        Social History:  Marital Status:   [4]  Social History     Tobacco Use     Smoking status: Every Day     Packs/day: 1.00     Years: 30.00     Pack years: 30.00     Types: Cigarettes     Smokeless tobacco: Never   Vaping Use     Vaping Use: Never used   Substance Use Topics     Alcohol use: Yes     Comment: 6 beers a week      Drug use: No        Medications:    predniSONE (DELTASONE) 10  "MG tablet          Review of Systems   All other systems reviewed and are negative.      Physical Exam   BP: 125/89  Pulse: (!) 129  Temp: 99  F (37.2  C)  Resp: 18  Height: 172.7 cm (5' 8\")  Weight: 95.3 kg (210 lb)  SpO2: 96 %      Physical Exam  Vitals and nursing note reviewed.   Constitutional:       General: She is not in acute distress.     Appearance: She is well-developed and normal weight. She is not ill-appearing.   HENT:      Head: Normocephalic and atraumatic.      Mouth/Throat:      Mouth: Mucous membranes are moist.      Pharynx: Oropharynx is clear.   Eyes:      Extraocular Movements: Extraocular movements intact.      Pupils: Pupils are equal, round, and reactive to light.   Cardiovascular:      Rate and Rhythm: Normal rate and regular rhythm.      Heart sounds: Normal heart sounds.   Pulmonary:      Effort: Pulmonary effort is normal.      Breath sounds: Normal breath sounds.   Abdominal:      Comments: Soft, tender epigastrium and right upper quadrant with voluntary guarding, no rebound and no referred pain.  No CVA tenderness.   Skin:     General: Skin is warm and dry.      Capillary Refill: Capillary refill takes less than 2 seconds.   Neurological:      General: No focal deficit present.      Mental Status: She is alert.   Psychiatric:         Mood and Affect: Mood normal.         Behavior: Behavior normal.         ED Course               EKG Interpretation:      Interpreted by Gomez Shaw MD  Time reviewed: Time obtained 1017 time interpreted same 106 bpm sinus tachycardia, no acute ST-T wave changes.  Impression sinus tachycardia       Procedures              Critical Care time:  none               Results for orders placed or performed during the hospital encounter of 10/10/22 (from the past 24 hour(s))   [facility] Draw    Narrative    The following orders were created for panel order [facility] Draw.  Procedure                               Abnormality         Status                   "   ---------                               -----------         ------                     Extra Blue Top Tube[[id number]]                              Final result               Extra Red Top Tube[[id number]]                               Final result               Extra Green Top (Lithium...[[id number]]                      Final result               Extra Purple Top Tube[[id number]]                            Final result                 Please view results for these tests on the individual orders.   Extra Blue Top Tube   Result Value Ref Range    Hold Specimen JIC    Extra Red Top Tube   Result Value Ref Range    Hold Specimen JIC    Extra Green Top (Lithium Heparin) Tube   Result Value Ref Range    Hold Specimen JIC    Extra Purple Top Tube   Result Value Ref Range    Hold Specimen JIC    CBC with platelets differential    Narrative    The following orders were created for panel order CBC with platelets differential.  Procedure                               Abnormality         Status                     ---------                               -----------         ------                     CBC with platelets and d...[[id number]]  Abnormal            Final result                 Please view results for these tests on the individual orders.   CRP inflammation   Result Value Ref Range    CRP Inflammation 215.07 (H) <5.00 mg/L   Comprehensive metabolic panel   Result Value Ref Range    Sodium 133 (L) 136 - 145 mmol/L    Potassium 4.0 3.4 - 5.3 mmol/L    Chloride 96 (L) 98 - 107 mmol/L    Carbon Dioxide (CO2) 21 (L) 22 - 29 mmol/L    Anion Gap 16 (H) 7 - 15 mmol/L    Urea Nitrogen 9.9 6.0 - 20.0 mg/dL    Creatinine 0.77 0.51 - 0.95 mg/dL    Calcium 9.1 8.6 - 10.0 mg/dL    Glucose 95 70 - 99 mg/dL    Alkaline Phosphatase 112 (H) 35 - 104 U/L    AST 27 10 - 35 U/L    ALT 49 (H) 10 - 35 U/L    Protein Total 7.5 6.4 - 8.3 g/dL    Albumin 3.9 3.5 - 5.2 g/dL    Bilirubin Total 0.7 <=1.2 mg/dL    GFR Estimate >90 >60 mL/min/1.73m2    Lipase   Result Value Ref Range    Lipase 77 (H) 13 - 60 U/L   Troponin T, High Sensitivity   Result Value Ref Range    Troponin T, High Sensitivity 7 <=14 ng/L   Nt probnp inpatient (BNP)   Result Value Ref Range    N terminal Pro BNP Inpatient 113 0 - 900 pg/mL   CBC with platelets and differential   Result Value Ref Range    WBC Count 19.2 (H) 4.0 - 11.0 10e3/uL    RBC Count 4.70 3.80 - 5.20 10e6/uL    Hemoglobin 14.8 11.7 - 15.7 g/dL    Hematocrit 42.9 35.0 - 47.0 %    MCV 91 78 - 100 fL    MCH 31.5 26.5 - 33.0 pg    MCHC 34.5 31.5 - 36.5 g/dL    RDW 12.0 10.0 - 15.0 %    Platelet Count 240 150 - 450 10e3/uL    % Neutrophils 81 %    % Lymphocytes 11 %    % Monocytes 7 %    % Eosinophils 0 %    % Basophils 0 %    % Immature Granulocytes 1 %    NRBCs per 100 WBC 0 <1 /100    Absolute Neutrophils 15.4 (H) 1.6 - 8.3 10e3/uL    Absolute Lymphocytes 2.2 0.8 - 5.3 10e3/uL    Absolute Monocytes 1.4 (H) 0.0 - 1.3 10e3/uL    Absolute Eosinophils 0.1 0.0 - 0.7 10e3/uL    Absolute Basophils 0.1 0.0 - 0.2 10e3/uL    Absolute Immature Granulocytes 0.1 <=0.4 10e3/uL    Absolute NRBCs 0.0 10e3/uL   Symptomatic; Unknown Influenza A/B & SARS-CoV2 (COVID-19) Virus PCR Multiplex Nose    Specimen: Nose; Swab   Result Value Ref Range    Influenza A PCR Negative Negative    Influenza B PCR Negative Negative    SARS CoV2 PCR Negative Negative    Narrative    Testing was performed using the shira SARS-CoV-2 & Influenza A/B Assay on the shira Alondra System. This test should be ordered for the detection of SARS-CoV-2 and influenza viruses in individuals who meet clinical and/or epidemiological criteria. Test performance is unknown in asymptomatic patients. This test is for in vitro diagnostic use under the FDA EUA for laboratories certified under CLIA to perform moderate and/or high complexity testing. This test has not been FDA cleared or approved. A negative result does not rule out the presence of PCR inhibitors in the specimen or  target RNA in concentration below the limit of detection for the assay. If only one viral target is positive but coinfection with multiple targets is suspected, the sample should be re-tested with another FDA cleared, approved or authorized test, if coinfection would change clinical management. [facility] are certified under the Clinical Laboratory Improvement Amendments of 1988 (CLIA-88) as qualified to perform moderate and/or high complexity laboratory testing.   Blood gas venous   Result Value Ref Range    pH Venous 7.43 7.32 - 7.43    pCO2 Venous 38 (L) 40 - 50 mm Hg    pO2 Venous 45 25 - 47 mm Hg    Bicarbonate Venous 26 21 - 28 mmol/L    Base Excess/Deficit (+/-) 1.3 -7.7 - 1.9 mmol/L    FIO2 0    CT Abdomen Pelvis w Contrast    Narrative    CT ABDOMEN AND PELVIS WITH CONTRAST 10/10/2022 12:08 PM    CLINICAL HISTORY: Epigastric pain, systemic leukocytosis, markedly  elevated CRP.    TECHNIQUE: CT scan of the abdomen and pelvis was performed following  injection of IV contrast. Multiplanar reformats were obtained. Dose  reduction techniques were used.  CONTRAST: 100 mL Isovue-370    COMPARISON: None.    FINDINGS:   LOWER CHEST: Normal.    HEPATOBILIARY: Absent gallbladder. Mild intra- and extrahepatic  biliary dilation is noted. No evidence for calcified stone or  obstructing mass in the distal common duct.    PANCREAS: Diffuse inflammatory change and edema is seen about the head  and body of the pancreas. This indicates acute interstitial  pancreatitis. There is a small amount of fluid along the inferior  aspect of the pancreatic body, but no organized collection. No  evidence for mass or pancreatic ductal dilation. The splenic vein is  patent.    SPLEEN: Normal.    ADRENAL GLANDS: Normal.    KIDNEYS/BLADDER: Small cysts are seen in the left kidney. No follow-up  necessary for these. No evidence of enhancing mass or hydronephrosis  on either side. The bladder is unremarkable.    BOWEL: No  evidence for bowel obstruction. Normal appendix. Sigmoid  diverticulosis is noted without evidence of diverticulitis.    PELVIC ORGANS: Moderate amount of free fluid is present within the  pelvis.    ADDITIONAL FINDINGS: Small fat-containing periumbilical hernia.    MUSCULOSKELETAL: Degenerative changes are noted through the spine.      Impression    IMPRESSION:   1.  Acute interstitial pancreatitis. Small amount of fluid is seen  along the inferior aspect of the pancreatic body, but no organized  collection.  2.  Mild intra- and extrahepatic biliary dilation may be related to  the patient's age and postcholecystectomy state. No evidence of  obstructing mass or calcified stone within the common duct.  3.  Moderate amount of free fluid within the pelvis.    SERGIO LATHAM MD         SYSTEM ID:  [id number]       Medications   ondansetron (ZOFRAN) injection 4 mg (has no administration in time range)   HYDROmorphone (PF) (DILAUDID) injection 0.5 mg (0.5 mg Intravenous Given 10/10/22 1457)   pantoprazole (PROTONIX) IV push injection 40 mg (40 mg Intravenous Given 10/10/22 1056)   lidocaine (viscous) (XYLOCAINE) 2 % 15 mL, alum & mag hydroxide-simethicone (MAALOX) 15 mL GI Cocktail (30 mLs Oral Given 10/10/22 1059)   ondansetron (ZOFRAN) injection 4 mg (4 mg Intravenous Given 10/10/22 1054)   lactated ringers BOLUS 1,000 mL (1,000 mLs Intravenous New Bag 10/10/22 1146)   HYDROmorphone (PF) (DILAUDID) injection 0.5 mg (0.5 mg Intravenous Given 10/10/22 1149)   iopamidol (ISOVUE-370) solution 100 mL (100 mLs Intravenous Given 10/10/22 1158)   sodium chloride 0.9 % bag 500 mL for CT scan flush use (66 mLs Intravenous Given 10/10/22 1158)   1:56 PM  I reviewed this patient in consultation with Dr. Lomas for general surgery.  He did not feel that we needed to do any additional evaluation but agreed with the plan for admission to the hospital service for supportive care, n.p.o. status, IV fluids and pain control i.e. gut  rest and observe for improvement.  I reviewed the patient with [place] for the hospitalist service with this plan in mind and they are in agreement with the plan to admit the patient.  I do plan to order a lipid profile for her in the morning fasting as she will be n.p.o. as this has not been done at all in her chart that I can find and hypertriglyceridemia would certainly be a consideration for possible etiology for pancreatitis.  Does not appear to be really alcohol related as the patient at least that he is history that she is giving me does not support significant alcohol consumption.  There really is no evidence for acute biliary sludge or stone at this point.  I suspect all of the findings that we are identified on CT imaging are related to her postcholecystectomy state.  There are no significant abnormals for her LFTs and her bilirubin is not elevated.  The patient is in agreement with the plan.          Assessments & Plan (with Medical Decision Making)   Assessment and plan with medical decision making at the time stamp above.      Disclaimer: This note consists of symbols derived from keyboarding, dictation and/or voice recognition software. As a result, there may be errors in the script that have gone undetected. Please consider this when interpreting information found in this chart.      I have reviewed the nursing notes.    I have reviewed the findings, diagnosis, plan and need for follow up with the patient.       New Prescriptions    No medications on file       Final diagnoses:   Acute pancreatitis, unspecified complication status, unspecified pancreatitis type       10/10/2022   [facility] EMERGENCY DEPT     Gomez Shaw MD  10/10/22 1658       Gomez Shaw MD  10/15/22 0019

## 2022-10-10 NOTE — ED NOTES
Patient has  [place] to Observation  order. Patient has been given Patient Bill of Rights, Observation brochure and  What does Observation mean to me  forms.  Patient has been given the opportunity to ask questions about observation status and their plan of care.    Steph Moon RN

## 2022-10-10 NOTE — ED NOTES
Patient presents to the ED for complaint of generalized abd pain. Reports pain started about 3 days ago on Friday night about 2100. The patient reports some vomiting. Mild nausea now. Reports pain is better than it was Friday. Has not been eating or drinking well over the past few days. Declines zofran at this time.

## 2022-10-10 NOTE — H&P
"[facility]    History and Physical  Hospital Medicine       Date of Admission:  10/10/2022  Date of Service: 10/10/2022     Assessment & Plan   Mariano Dey is a 51 year old female who presents on 10/10/2022 with abdominal pain. Physical exam and imaging diagnostic for pancreatitis, though lipase only minimally elevated on presentation.     Pancreatitis, acute, etiology unclear  History of biliary colic & cholelithiasis s/p cholecystectomy  Sudden onset epigastric pain Friday after eating pizza & 1 beer. No relief in pain since. History cholecystectomy 2013. On admission CT shows acute interstitial pancreatitis with biliary duct only mildly dilated without evidence of stones. Lipase minimally elevated (80), but diagnosis of pancreatitis made based on pain & imaging findings. EKG & troponin normal. Last EGD 2021 showed duodenitis. Does not drink significant amounts ETOH, not on any medications suspicious for causing pancreatitis. Etiology unclear. ER spoke with surgery, plan to just watch and treat as pancreatitis for now.  Differential if not improving includes post-cholecystectomy syndrome, sphincter of oddi dysfunction and may warrant MRCP or GI consult.   -Lipid panel in AM  -Lr 200/hr  -hydromorphone 0.3 - 0.5mg IV Q3hrs PRN for pain  -CMP in AM (trend liver function)  -CBC in AM  -NPO except meds & ice chips  -Consider re-consulting surgery if pain not improving or LFTs worsen in AM    Elevated blood pressure without diagnosis of hypertension  In ER 's, DBP 90's. Likely secondary to pain. Otherwise asymptomatic. Monitor.     Rheumatoid arthritis  Stable, has prednisone for flares, has not required recently. No acute interventions.       Clinically Significant Risk Factors Present on Admission                    # Obesity: Estimated body mass index is 31.93 kg/m  as calculated from the following:    Height as of this encounter: 1.727 m (5' 8\").    Weight as of this " encounter: 95.3 kg (210 lb).             Diet:  NPO  DVT Prophylaxis: Ambulate every shift  Nunes Catheter: Not present  Code Status:   full code  Lines: PIV    Disposition Plan      Expected Discharge Date: 10/11/2022             Entered: Kaela Reddy PA-C 10/10/2022, 4:38 PM     Status: Patient is appropriate for observation  Kaela Reddy PA-C        The patient's care was discussed with the Attending Physician, Dr. Ángel Shirley, and the patient.    Primary Care Physician   Shilpa Guerra [phone]    History is obtained from the patient, who is a decent historian, handoff from ER provider, and review of old records via the EMR.    History of Present Illness   Mariano Dey is a 51 year old female with past medical history of cholelithiasis s/p cholecystectomy in 2013, migraines, rhuematoid arthritis, pancreatitis now presents on 10/10/2022 with abdominal pain.     Patient had sudden onset epigastric and periumbilical pain beginning Friday after eating pizza and consuming 1 beer.  Pain has been constant since Friday, a bloated pressure feeling.  Currently 7/10, has been worse in past few days.  Some nausea and vomiting 10/8/2022, nonbloody nonbilious.  Bowel movements have been normal, last BM brown, formed on 10/9/2022.  Pain does not radiate into patient's chest or shoulder or into patient's back.  No associated lightheadedness, dizziness, syncope.  Patient has tried Tums, Pepto-Bismol, acetaminophen, aspirin, Aleve, Motrin and none of these have helped her discomfort at all.  Denies fever, chills.  Denies sick contacts.  Endorses some discomfort with deep breath but otherwise no new coughing or dyspnea.  Has not had anything to eat since Friday (10/7).     Patient has a history of cholelithiasis status postcholecystectomy in 2013.  Has had pancreatitis also in 2013 associated with gallstones.  Endorses this pain feels similar.  Since cholecystectomy in 2013 patient has only had 1 other  episode of abdominal pain in 2021.  At that time she received an EGD which revealed mild duodenitis and was otherwise unremarkable.    Upon arrival to the emergency department the patient received a lab and imaging work-up.  Patient has received 0.5 mg IV hydromorphone for pain as well as 1 L fluid bolus, ondansetron, and pantoprazole 40 mg IV.    Review of Systems   Constitutional: denies weight loss, fever, chills   Eyes: denies changes in vision, discharge, or pain in eyes  HENT: denies changes in hearing, sore throat  Respiratory: denies new or changing cough, dyspnea  Cardiovascular: denies chest pain, heart palpitations, lightheadedness, syncope, limb swelling  Gastroenterology: See HPI  Genitourinary: denies dysuria  Integumentary: no new rashes or skin changes  Musculoskeletal: denies new muscle pain or joint trauma  Neuro: denies numbness, tingling, headaches, tremor  Psychiatric: denies significant changes to mood    Past Medical History      Cholelithiasis 06/13/2013     Priority: Medium     Pancreatitis 05/18/2013     Priority: Medium     Migraines      Priority: Medium     RA (rheumatoid arthritis) (H)      Priority: Medium      Past Surgical History   Past Surgical History:   Procedure Laterality Date     CHOLECYSTECTOMY  6/13/13     ESOPHAGOSCOPY, GASTROSCOPY, DUODENOSCOPY (EGD), COMBINED N/A 2/19/2021    Procedure: ESOPHAGOGASTRODUODENOSCOPY, WITH BIOPSY;  Surgeon: Bobby Duffy MD;  Location: [facility]     LAPAROSCOPIC CHOLECYSTECTOMY  6/13/2013    Procedure: LAPAROSCOPIC CHOLECYSTECTOMY;  Laparoscopic Cholecystetomy;  Surgeon: Joshua Quiñonez MD;  Location: [facility]      Prior to Admission Medications   Prior to Admission Medications   Prescriptions Last Dose Informant Patient Reported? Taking?   predniSONE (DELTASONE) 10 MG tablet over a year Self No No   Sig: To prevent gout flares: At the first sign of a recurrent flare, take two tabs (20 mg) of prednisone.  Take two tabs (20 mg) of prednisone each  "day for the next 4 days, then stop.  Notify rheumatology after using a course of prednisone so that the medication can be refilled.      Facility-Administered Medications: None     Allergies   Allergies   Allergen Reactions     Nkda [No Known Drug Allergies]      Family History    Family History   Problem Relation Age of Onset     Cancer Mother      Other Cancer Mother         Lung     Substance Abuse Mother         Alcohol     Cancer Father      Breast Cancer Maternal Grandmother      Cardiovascular Maternal Grandmother      CARMEN Maternal Grandfather      Social History   Social History     Socioeconomic History     Marital status:    Tobacco Use     Smoking status: Every Day     Packs/day: 1.00     Years: 30.00     Pack years: 30.00     Types: Cigarettes     Smokeless tobacco: Never   Vaping Use     Vaping Use: Never used   Substance and Sexual Activity     Alcohol use: Yes     Comment: 6 beers a week      Drug use: No     Sexual activity: Not Currently     Physical Exam   BP (!) 169/97   Pulse 94   Temp 99  F (37.2  C)   Resp 18   Ht 1.727 m (5' 8\")   Wt 95.3 kg (210 lb)   LMP 07/16/2015   SpO2 96%   BMI 31.93 kg/m       Weight: 210 lbs 0 oz Body mass index is 31.93 kg/m .     Constitutional: Alert, oriented, cooperative, appears uncomfortable and distressed in bed, wincing in pain, appears nontoxic,  Eyes: Eyes are clear, pupils are equal, round.  No exudate.  HENT: No evidence of cranial trauma.  Cardiovascular: Regular rate and rhythm, normal S1 and S2, and no murmur noted. Good peripheral pulses in wrists bilaterally. No lower extremity edema.  Respiratory: Clear to auscultation bilaterally, unlabored on room air, no wheezes, rhonchi, rales.  GI: Mildly distended without scarring or bruising.  Bowel sounds present throughout all quadrants.  Soft, tender to palpation of left and right upper quadrant with significant tenderness to epigastric and periumbilical region.  Ledesma sign negative, no " rebound tenderness.   Genitourinary: Deferred  Musculoskeletal: Normal muscle bulk, no obvious joint deformities.  Skin: Warm and dry, no rashes.  No jaundice  Neurologic: Neck supple. Cranial nerves are grossly intact.  is symmetric.  No tremor.    Data   Data reviewed today:   Recent Labs   Lab 10/10/22  0954   WBC 19.2*   HGB 14.8   MCV 91      *   POTASSIUM 4.0   CHLORIDE 96*   CO2 21*   BUN 9.9   CR 0.77   ANIONGAP 16*   NORRIS 9.1   GLC 95   ALBUMIN 3.9   PROTTOTAL 7.5   BILITOTAL 0.7   ALKPHOS 112*   ALT 49*   AST 27   LIPASE 77*     Recent Results (from the past 24 hour(s))   CT Abdomen Pelvis w Contrast    Narrative    CT ABDOMEN AND PELVIS WITH CONTRAST 10/10/2022 12:08 PM  CLINICAL HISTORY: Epigastric pain, systemic leukocytosis, markedly  elevated CRP.  TECHNIQUE: CT scan of the abdomen and pelvis was performed following  injection of IV contrast. Multiplanar reformats were obtained. Dose  reduction techniques were used.  CONTRAST: 100 mL Isovue-[id number]  COMPARISON: None.  FINDINGS:   LOWER CHEST: Normal.  HEPATOBILIARY: Absent gallbladder. Mild intra- and extrahepatic  biliary dilation is noted. No evidence for calcified stone or  obstructing mass in the distal common duct.  PANCREAS: Diffuse inflammatory change and edema is seen about the head  and body of the pancreas. This indicates acute interstitial  pancreatitis. There is a small amount of fluid along the inferior  aspect of the pancreatic body, but no organized collection. No  evidence for mass or pancreatic ductal dilation. The splenic vein is  patent.  SPLEEN: Normal.  ADRENAL GLANDS: Normal.  KIDNEYS/BLADDER: Small cysts are seen in the left kidney. No follow-up  necessary for these. No evidence of enhancing mass or hydronephrosis  on either side. The bladder is unremarkable.  BOWEL: No evidence for bowel obstruction. Normal appendix. Sigmoid  diverticulosis is noted without evidence of diverticulitis.  PELVIC ORGANS: Moderate amount  of free fluid is present within the  pelvis.  ADDITIONAL FINDINGS: Small fat-containing periumbilical hernia.  MUSCULOSKELETAL: Degenerative changes are noted through the spine.    Impression    IMPRESSION:   1.  Acute interstitial pancreatitis. Small amount of fluid is seen  along the inferior aspect of the pancreatic body, but no organized  collection.  2.  Mild intra- and extrahepatic biliary dilation may be related to  the patient's age and postcholecystectomy state. No evidence of  obstructing mass or calcified stone within the common duct.  3.  Moderate amount of free fluid within the pelvis.     I personally reviewed the EKG tracing showing sinus tachycardia

## 2022-10-10 NOTE — ED TRIAGE NOTES
Abdominal pain, N/V, and SOA started Friday night. Pain intermittent, started in epigastric region and is now generalized. Pt reports previous episodes of similar symptoms that usually resolve in 1-2 days-has not been seen for symptoms previously.      Triage Assessment     Row Name 10/10/22 0940       Triage Assessment (Adult)    Airway WDL WDL       Respiratory WDL    Respiratory WDL X;rhythm/pattern    Rhythm/Pattern, Respiratory shortness of breath       Skin Circulation/Temperature WDL    Skin Circulation/Temperature WDL WDL       Cardiac WDL    Cardiac WDL WDL       Peripheral/Neurovascular WDL    Peripheral Neurovascular WDL WDL       Cognitive/Neuro/Behavioral WDL    Cognitive/Neuro/Behavioral WDL WDL

## 2022-10-11 PROBLEM — Z11.52 ENCOUNTER FOR SCREENING LABORATORY TESTING FOR SEVERE ACUTE RESPIRATORY SYNDROME CORONAVIRUS 2 (SARS-COV-2): Status: ACTIVE | Noted: 2022-10-11

## 2022-10-11 PROBLEM — K85.90 ACUTE PANCREATITIS, UNSPECIFIED COMPLICATION STATUS, UNSPECIFIED PANCREATITIS TYPE: Status: ACTIVE | Noted: 2022-10-11

## 2022-10-11 LAB
ALBUMIN SERPL BCG-MCNC: 3 G/DL (ref 3.5–5.2)
ALP SERPL-CCNC: 102 U/L (ref 35–104)
ALT SERPL W P-5'-P-CCNC: 32 U/L (ref 10–35)
ANION GAP SERPL CALCULATED.3IONS-SCNC: 15 MMOL/L (ref 7–15)
AST SERPL W P-5'-P-CCNC: 22 U/L (ref 10–35)
BILIRUB SERPL-MCNC: 0.5 MG/DL
BUN SERPL-MCNC: 9 MG/DL (ref 6–20)
CALCIUM SERPL-MCNC: 8.3 MG/DL (ref 8.6–10)
CHLORIDE SERPL-SCNC: 99 MMOL/L (ref 98–107)
CHOLEST SERPL-MCNC: 133 MG/DL
CREAT SERPL-MCNC: 0.67 MG/DL (ref 0.51–0.95)
DEPRECATED HCO3 PLAS-SCNC: 21 MMOL/L (ref 22–29)
ERYTHROCYTE [DISTWIDTH] IN BLOOD BY AUTOMATED COUNT: 11.9 % (ref 10–15)
GFR SERPL CREATININE-BSD FRML MDRD: >90 ML/MIN/1.73M2
GLUCOSE SERPL-MCNC: 77 MG/DL (ref 70–99)
HCT VFR BLD AUTO: 35.7 % (ref 35–47)
HDLC SERPL-MCNC: 30 MG/DL
HGB BLD-MCNC: 11.9 G/DL (ref 11.7–15.7)
LDLC SERPL CALC-MCNC: 82 MG/DL
MCH RBC QN AUTO: 30.9 PG (ref 26.5–33)
MCHC RBC AUTO-ENTMCNC: 33.3 G/DL (ref 31.5–36.5)
MCV RBC AUTO: 93 FL (ref 78–100)
NONHDLC SERPL-MCNC: 103 MG/DL
PLATELET # BLD AUTO: 199 10E3/UL (ref 150–450)
POTASSIUM SERPL-SCNC: 3.9 MMOL/L (ref 3.4–5.3)
PROT SERPL-MCNC: 5.6 G/DL (ref 6.4–8.3)
RBC # BLD AUTO: 3.85 10E6/UL (ref 3.8–5.2)
SODIUM SERPL-SCNC: 135 MMOL/L (ref 136–145)
TRIGL SERPL-MCNC: 105 MG/DL
WBC # BLD AUTO: 14.3 10E3/UL (ref 4–11)

## 2022-10-11 PROCEDURE — 120N000001 HC R&B MED SURG/OB

## 2022-10-11 PROCEDURE — 80053 COMPREHEN METABOLIC PANEL: CPT

## 2022-10-11 PROCEDURE — 80061 LIPID PANEL: CPT | Performed by: FAMILY MEDICINE

## 2022-10-11 PROCEDURE — 36415 COLL VENOUS BLD VENIPUNCTURE: CPT

## 2022-10-11 PROCEDURE — 250N000011 HC RX IP 250 OP 636

## 2022-10-11 PROCEDURE — 258N000003 HC RX IP 258 OP 636

## 2022-10-11 PROCEDURE — 99233 SBSQ HOSP IP/OBS HIGH 50: CPT | Performed by: INTERNAL MEDICINE

## 2022-10-11 PROCEDURE — 96376 TX/PRO/DX INJ SAME DRUG ADON: CPT

## 2022-10-11 PROCEDURE — 85027 COMPLETE CBC AUTOMATED: CPT

## 2022-10-11 PROCEDURE — G0378 HOSPITAL OBSERVATION PER HR: HCPCS

## 2022-10-11 RX ADMIN — HYDROMORPHONE HYDROCHLORIDE 0.5 MG: 1 INJECTION, SOLUTION INTRAMUSCULAR; INTRAVENOUS; SUBCUTANEOUS at 14:09

## 2022-10-11 RX ADMIN — HYDROMORPHONE HYDROCHLORIDE 0.5 MG: 1 INJECTION, SOLUTION INTRAMUSCULAR; INTRAVENOUS; SUBCUTANEOUS at 11:19

## 2022-10-11 RX ADMIN — SODIUM CHLORIDE, POTASSIUM CHLORIDE, SODIUM LACTATE AND CALCIUM CHLORIDE 1000 ML: 600; 310; 30; 20 INJECTION, SOLUTION INTRAVENOUS at 08:04

## 2022-10-11 RX ADMIN — HYDROMORPHONE HYDROCHLORIDE 0.5 MG: 1 INJECTION, SOLUTION INTRAMUSCULAR; INTRAVENOUS; SUBCUTANEOUS at 17:53

## 2022-10-11 RX ADMIN — HYDROMORPHONE HYDROCHLORIDE 0.5 MG: 1 INJECTION, SOLUTION INTRAMUSCULAR; INTRAVENOUS; SUBCUTANEOUS at 07:56

## 2022-10-11 RX ADMIN — SODIUM CHLORIDE, POTASSIUM CHLORIDE, SODIUM LACTATE AND CALCIUM CHLORIDE 1000 ML: 600; 310; 30; 20 INJECTION, SOLUTION INTRAVENOUS at 05:55

## 2022-10-11 RX ADMIN — SODIUM CHLORIDE, POTASSIUM CHLORIDE, SODIUM LACTATE AND CALCIUM CHLORIDE 1000 ML: 600; 310; 30; 20 INJECTION, SOLUTION INTRAVENOUS at 11:49

## 2022-10-11 RX ADMIN — HYDROMORPHONE HYDROCHLORIDE 0.5 MG: 1 INJECTION, SOLUTION INTRAMUSCULAR; INTRAVENOUS; SUBCUTANEOUS at 16:11

## 2022-10-11 RX ADMIN — HYDROMORPHONE HYDROCHLORIDE 0.5 MG: 1 INJECTION, SOLUTION INTRAMUSCULAR; INTRAVENOUS; SUBCUTANEOUS at 00:26

## 2022-10-11 RX ADMIN — SODIUM CHLORIDE, POTASSIUM CHLORIDE, SODIUM LACTATE AND CALCIUM CHLORIDE 1000 ML: 600; 310; 30; 20 INJECTION, SOLUTION INTRAVENOUS at 00:29

## 2022-10-11 RX ADMIN — HYDROMORPHONE HYDROCHLORIDE 0.5 MG: 1 INJECTION, SOLUTION INTRAMUSCULAR; INTRAVENOUS; SUBCUTANEOUS at 02:22

## 2022-10-11 RX ADMIN — SODIUM CHLORIDE, POTASSIUM CHLORIDE, SODIUM LACTATE AND CALCIUM CHLORIDE 1000 ML: 600; 310; 30; 20 INJECTION, SOLUTION INTRAVENOUS at 17:45

## 2022-10-11 RX ADMIN — HYDROMORPHONE HYDROCHLORIDE 0.5 MG: 1 INJECTION, SOLUTION INTRAMUSCULAR; INTRAVENOUS; SUBCUTANEOUS at 20:23

## 2022-10-11 RX ADMIN — HYDROMORPHONE HYDROCHLORIDE 0.5 MG: 1 INJECTION, SOLUTION INTRAMUSCULAR; INTRAVENOUS; SUBCUTANEOUS at 23:10

## 2022-10-11 RX ADMIN — HYDROMORPHONE HYDROCHLORIDE 0.5 MG: 1 INJECTION, SOLUTION INTRAMUSCULAR; INTRAVENOUS; SUBCUTANEOUS at 05:18

## 2022-10-11 RX ADMIN — SODIUM CHLORIDE, POTASSIUM CHLORIDE, SODIUM LACTATE AND CALCIUM CHLORIDE 1000 ML: 600; 310; 30; 20 INJECTION, SOLUTION INTRAVENOUS at 23:06

## 2022-10-11 ASSESSMENT — ACTIVITIES OF DAILY LIVING (ADL)
ADLS_ACUITY_SCORE: 20

## 2022-10-11 NOTE — PLAN OF CARE
Goal Outcome Evaluation:      Plan of Care Reviewed With: patient    Overall Patient Progress: improvingOverall Patient Progress: improving       Pt pain is still consistent; pt asking for pain meds still continues to have median mid chest discomfort 6-7/10. Asking if she can have pain meds more often but she is asking for it every 3 hours and can have it every two hours as ordered but Rn informed her she still needs to ask for it. Iv dilaudid 0.5mg given when pt asks. Does seem to help with discomfort. Pt needed a new piv due to the right one was painful.

## 2022-10-11 NOTE — PROVIDER NOTIFICATION
Dr. Shirley notified that pt requesting to have pain meds more often or increased in frequency or dosage for abdominal. Pt states she feels puffy, abdomin is distended. Patient was informed by RN that she needs to ask for them as its prn and she had only been requesting it every three hours and she can have it every two hours; hopefully this will help. MD did not want to increase her dosage or frequency at this time as she can ask for it more often. Heat pad and aromatherapy given to help with discomfort.

## 2022-10-11 NOTE — PROGRESS NOTES
"[facility]    Hospitalist Progress Note    Date of Service (when I saw the patient): 10/11/2022    Assessment & Plan   Mariano Dey is a 51 year old female who presents on 10/10/2022 with abdominal pain. Physical exam and imaging diagnostic for pancreatitis, though lipase only minimally elevated on presentation.      Pancreatitis, acute, etiology unclear  History of biliary colic & cholelithiasis s/p cholecystectomy  Sudden onset epigastric pain Friday after eating pizza & 1 beer. No relief in pain since. History cholecystectomy 2013. On admission CT shows acute interstitial pancreatitis with biliary duct only mildly dilated without evidence of stones. Lipase minimally elevated (80), but diagnosis of pancreatitis made based on pain & imaging findings. EKG & troponin normal. Last EGD 2021 showed duodenitis. Does not drink significant amounts ETOH, not on any medications suspicious for causing pancreatitis. Etiology unclear. ER spoke with surgery, plan to just watch and treat as pancreatitis for now.  Differential if not improving includes post-cholecystectomy syndrome, sphincter of oddi dysfunction and may warrant MRCP or GI consult.   -Lipid panel unremarkable  -Lr 200/hr  -hydromorphone 0.3 - 0.5mg IV Q3hrs PRN for pain  -LFTs on admission minimally elevated; in AM of 10/11 improved slightly  -CBC in AM  -Continue NPO except meds & ice chips  -Continue pain control overnight     Elevated blood pressure without diagnosis of hypertension  In ER 's, DBP 90's. Likely secondary to pain. Otherwise asymptomatic. Monitor.      Rheumatoid arthritis  Stable, has prednisone for flares, has not required recently. No acute interventions.      Clinically Significant Risk Factors Present on Admission         # Obesity: Estimated body mass index is 31.93 kg/m  as calculated from the following:    Height as of this encounter: 1.727 m (5' 8\").    Weight as of this encounter: 95.3 kg (210 " lb).     Diet:  NPO  DVT Prophylaxis: Ambulate every shift  Nunes Catheter: Not present  Code Status:   full code  Lines: PIV    Disposition: Expected discharge in 1-3 days once pain controlled and tolerating diet.    Ángel Shirley MD    Interval History   The patient complains of ongoing abdominal pain.  Patient states worsened with ice chips, associated with increased nausea.    -Data reviewed today: I reviewed all new labs and imaging results over the last 24 hours. I personally reviewed no images or EKG's today.    Physical Exam   Temp: 99.6  F (37.6  C) Temp src: Oral BP: (!) 140/77 Pulse: 89   Resp: 16 SpO2: 94 % O2 Device: None (Room air)    Vitals:    10/10/22 0938   Weight: 95.3 kg (210 lb)     Vital Signs with Ranges  Temp:  [97.8  F (36.6  C)-99.6  F (37.6  C)] 99.6  F (37.6  C)  Pulse:  [] 89  Resp:  [16-18] 16  BP: (136-190)/() 140/77  SpO2:  [94 %-97 %] 94 %  I/O last 3 completed shifts:  In: 100 [P.O.:100]  Out: -     Gen: Well nourished, well developed, alert and oriented x 3, appears mildly uncomforable  HEENT: Atraumatic, normocephalic; sclera non-injected, anicterric; oral mucosa moist, no lesion, no exudate  Lungs: Clear to ausculation, no wheezes, no rhonchi, no rales  Heart: Regular rate, regular rhythm, no gallops, no rubs, no murmurs  GI: Bowel sound normal, no hepatosplenomegaly, no masses, non-tender, non-distended, no guarding, no rebound tenderness  Lymph: No lymphadenopathy, no edema  Skin: No rashes, no chronic venous stasis     Medications     lactated ringers 1,000 mL (10/11/22 1149)         Data   Recent Labs   Lab 10/11/22  0446 10/10/22  0954   WBC 14.3* 19.2*   HGB 11.9 14.8   MCV 93 91    240   * 133*   POTASSIUM 3.9 4.0   CHLORIDE 99 96*   CO2 21* 21*   BUN 9.0 9.9   CR 0.67 0.77   ANIONGAP 15 16*   NORRIS 8.3* 9.1   GLC 77 95   ALBUMIN 3.0* 3.9   PROTTOTAL 5.6* 7.5   BILITOTAL 0.5 0.7   ALKPHOS 102 112*   ALT 32 49*   AST 22 27   LIPASE  --  77*        No results found for this or any previous visit (from the past 24 hour(s)).

## 2022-10-11 NOTE — UTILIZATION REVIEW
[facility]   Admission Status; Secondary Review Determination   Admission date:  10/10/2022  9:42 AM    Under the authority of the Utilization Management Committee, the utilization review process indicated a secondary review on the above patient. The review outcome is based on review of the medical records, discussions with staff, and applying clinical experience noted on the date of the review.     (x) Inpatient Status Appropriate - This patient's medical care is consistent with medical management for inpatient care and reasonable inpatient medical practice.     RATIONALE FOR DETERMINATION   51-year-old female was admitted on 10/10 with abdominal pain.  CT showed acute interstitial pancreatitis with biliary duct mildly dilated.  Lipase was elevated.  She was thought to have acute pancreatitis.  She was started on IV fluids and bowel rest.  She has been receiving frequent doses of IV Dilaudid.  This patient is complicated, at high risk for clinical deterioration, requires high level cares, will likely be hospital several days, and is appropriate for inpatient hospitalization at the time of this review.  This recommendation was paged to Dr. Shirley.  The severity of illness, intensity of service provided, expected LOS and risk for adverse outcome make the care complex, high risk and appropriate for hospital admission.    At the time of admission with the information available to the attending physician more than 2 nights Hospital complex care was anticipated, based on patient risk of adverse outcome if treated as outpatient and complex care required.  Inpatient admission is appropriate based on the Medicare guidelines.      The information on this document is developed by the utilization review team in order for the business office to ensure compliance. This only denotes the appropriateness of proper admission status and does not reflect the quality of care rendered.   The definitions of Inpatient Status  and Observation Status used in making the determination above are those provided in the CMS Coverage Manual, Chapter 1 and Chapter 6, section 70.4.     Sincerely,   Neville Ramos DO MPH   Physician Advisor  Utilization Review  [facility]

## 2022-10-11 NOTE — PROGRESS NOTES
"[facility] ADMISSION NOTE      Patient admitted to room 2308 at approximately . via wheel chair from emergency room. Patient was accompanied by transport tech.     *Pt was admitted on previous shift    Verbal SBAR report received from ? prior to patient arrival.     Patient ambulated to bed with stand-by assist. Patient alert and oriented X 3. Pain is controlled with current analgesics.  Medication(s) being used: narcotic analgesics including diluadid.  . Admission vital signs: Blood pressure 139/74, pulse 96, temperature 97.8  F (36.6  C), temperature source Oral, resp. rate 18, height 1.702 m (5' 7\"), weight 95.3 kg (210 lb), last menstrual period 07/16/2015, SpO2 97 %, not currently breastfeeding. Patient was oriented to plan of care, call light, bed controls, tv, telephone, bathroom and visiting hours.     Risk Assessment    The following safety risks were identified during admission: none. Yellow risk band applied: NO.     Skin Initial Assessment    This writer admitted this patient and completed a full skin assessment and Daryn score in the Adult PCS flowsheet. Appropriate interventions initiated as needed.     Secondary skin check completed by Julia SABILLON.    Daryn Risk Assessment  Sensory Perception: 4-->no impairment  Moisture: 4-->rarely moist  Activity: 4-->walks frequently  Mobility: 4-->no limitation  Nutrition: 2-->probably inadequate  Friction and Shear: 3-->no apparent problem  Daryn Score: 21    Education    Patient has a [place] to Observation order: Yes  Observation education completed and documented: Yes      Michelle Oh RN    "

## 2022-10-11 NOTE — PLAN OF CARE
Pt A&Ox 4. PIV infusing LR without issues. No skin issues. Pt utilized PRN Diluadid q 2 hours to control abdominal pain, no other pain reported. Slept on and off. Tolerating ice chips and being NPO. Denied nausea. SBA to independent in room. Uses call light appropriately. Voiding without issues.

## 2022-10-12 LAB
ANION GAP SERPL CALCULATED.3IONS-SCNC: 16 MMOL/L (ref 7–15)
BUN SERPL-MCNC: 6.1 MG/DL (ref 6–20)
CALCIUM SERPL-MCNC: 8.4 MG/DL (ref 8.6–10)
CHLORIDE SERPL-SCNC: 99 MMOL/L (ref 98–107)
CREAT SERPL-MCNC: 0.68 MG/DL (ref 0.51–0.95)
DEPRECATED HCO3 PLAS-SCNC: 22 MMOL/L (ref 22–29)
ERYTHROCYTE [DISTWIDTH] IN BLOOD BY AUTOMATED COUNT: 11.6 % (ref 10–15)
GFR SERPL CREATININE-BSD FRML MDRD: >90 ML/MIN/1.73M2
GLUCOSE SERPL-MCNC: 66 MG/DL (ref 70–99)
HCT VFR BLD AUTO: 34.6 % (ref 35–47)
HGB BLD-MCNC: 11.6 G/DL (ref 11.7–15.7)
MCH RBC QN AUTO: 30.6 PG (ref 26.5–33)
MCHC RBC AUTO-ENTMCNC: 33.5 G/DL (ref 31.5–36.5)
MCV RBC AUTO: 91 FL (ref 78–100)
PLATELET # BLD AUTO: 200 10E3/UL (ref 150–450)
POTASSIUM SERPL-SCNC: 3.8 MMOL/L (ref 3.4–5.3)
RBC # BLD AUTO: 3.79 10E6/UL (ref 3.8–5.2)
SODIUM SERPL-SCNC: 137 MMOL/L (ref 136–145)
WBC # BLD AUTO: 11.1 10E3/UL (ref 4–11)

## 2022-10-12 PROCEDURE — 85027 COMPLETE CBC AUTOMATED: CPT | Performed by: INTERNAL MEDICINE

## 2022-10-12 PROCEDURE — 258N000003 HC RX IP 258 OP 636

## 2022-10-12 PROCEDURE — 82310 ASSAY OF CALCIUM: CPT | Performed by: INTERNAL MEDICINE

## 2022-10-12 PROCEDURE — 36415 COLL VENOUS BLD VENIPUNCTURE: CPT | Performed by: INTERNAL MEDICINE

## 2022-10-12 PROCEDURE — 120N000001 HC R&B MED SURG/OB

## 2022-10-12 PROCEDURE — 250N000011 HC RX IP 250 OP 636

## 2022-10-12 PROCEDURE — 250N000013 HC RX MED GY IP 250 OP 250 PS 637: Performed by: INTERNAL MEDICINE

## 2022-10-12 PROCEDURE — 99233 SBSQ HOSP IP/OBS HIGH 50: CPT | Performed by: INTERNAL MEDICINE

## 2022-10-12 RX ORDER — BENZOCAINE/MENTHOL 6 MG-10 MG
LOZENGE MUCOUS MEMBRANE 2 TIMES DAILY
Status: DISCONTINUED | OUTPATIENT
Start: 2022-10-12 | End: 2022-10-14 | Stop reason: HOSPADM

## 2022-10-12 RX ADMIN — HYDROMORPHONE HYDROCHLORIDE 0.5 MG: 1 INJECTION, SOLUTION INTRAMUSCULAR; INTRAVENOUS; SUBCUTANEOUS at 19:21

## 2022-10-12 RX ADMIN — SODIUM CHLORIDE, POTASSIUM CHLORIDE, SODIUM LACTATE AND CALCIUM CHLORIDE 1000 ML: 600; 310; 30; 20 INJECTION, SOLUTION INTRAVENOUS at 04:10

## 2022-10-12 RX ADMIN — HYDROMORPHONE HYDROCHLORIDE 0.5 MG: 1 INJECTION, SOLUTION INTRAMUSCULAR; INTRAVENOUS; SUBCUTANEOUS at 08:00

## 2022-10-12 RX ADMIN — HYDROMORPHONE HYDROCHLORIDE 0.5 MG: 1 INJECTION, SOLUTION INTRAMUSCULAR; INTRAVENOUS; SUBCUTANEOUS at 10:35

## 2022-10-12 RX ADMIN — HYDROMORPHONE HYDROCHLORIDE 0.5 MG: 1 INJECTION, SOLUTION INTRAMUSCULAR; INTRAVENOUS; SUBCUTANEOUS at 02:31

## 2022-10-12 RX ADMIN — SODIUM CHLORIDE, POTASSIUM CHLORIDE, SODIUM LACTATE AND CALCIUM CHLORIDE 1000 ML: 600; 310; 30; 20 INJECTION, SOLUTION INTRAVENOUS at 15:09

## 2022-10-12 RX ADMIN — SODIUM CHLORIDE, POTASSIUM CHLORIDE, SODIUM LACTATE AND CALCIUM CHLORIDE 1000 ML: 600; 310; 30; 20 INJECTION, SOLUTION INTRAVENOUS at 09:32

## 2022-10-12 RX ADMIN — HYDROMORPHONE HYDROCHLORIDE 0.5 MG: 1 INJECTION, SOLUTION INTRAMUSCULAR; INTRAVENOUS; SUBCUTANEOUS at 15:41

## 2022-10-12 RX ADMIN — HYDROMORPHONE HYDROCHLORIDE 0.5 MG: 1 INJECTION, SOLUTION INTRAMUSCULAR; INTRAVENOUS; SUBCUTANEOUS at 05:00

## 2022-10-12 RX ADMIN — HYDROMORPHONE HYDROCHLORIDE 0.5 MG: 1 INJECTION, SOLUTION INTRAMUSCULAR; INTRAVENOUS; SUBCUTANEOUS at 21:48

## 2022-10-12 RX ADMIN — HYDROMORPHONE HYDROCHLORIDE 0.5 MG: 1 INJECTION, SOLUTION INTRAMUSCULAR; INTRAVENOUS; SUBCUTANEOUS at 13:03

## 2022-10-12 RX ADMIN — HYDROCORTISONE: 1 CREAM TOPICAL at 21:46

## 2022-10-12 ASSESSMENT — ACTIVITIES OF DAILY LIVING (ADL)
ADLS_ACUITY_SCORE: 20

## 2022-10-12 NOTE — PLAN OF CARE
End Of Shift Note        Neuro: A&O x4. Pleasant, cooperative and calm. Uses call light appropriately     Cardiac: WNL. VSS, slightly elevated B/P     Resp: Denies SOB/dyspnea. Sating in mid 90's on RA. LS clear     GI/: Voids independently, reports good output. No BM reported . Continues NPO with ice. Reports ice upsets stomach at times     MSK: Pt ambulating independently. Pt at baseline. No deficits or weakness noted    Pain: Pain rating 6+ in abdomen. PRN dilaudid every 2-3 hours utilized. Dilaudid effective for short term pain control, otherwise not well controlled.      Skin: No skin issues noted.      LDAs: PIV infusing LR at 200ml/hr. CDI

## 2022-10-12 NOTE — PLAN OF CARE
"Patient is alert and oriented x 4.  Reports \"mid abdominal pain\" and has been given IV dilaudid 0.5 mg as needed.  Denies nausea.  Okay to start clear liquids today.  Has had 8 oz of broth and ice chips.  Patient states \"I think the broth has made it hurt a little more.\"  Up independently in her room and ambulated in the hallway x 1 today.    Goal Outcome Evaluation:      Plan of Care Reviewed With: patient                 "

## 2022-10-12 NOTE — PROGRESS NOTES
"[facility]    Hospitalist Progress Note    Date of Service (when I saw the patient): 10/12/2022    Assessment & Plan   Mariano Dey is a 51 year old female who presents on 10/10/2022 with abdominal pain. Physical exam and imaging diagnostic for pancreatitis, though lipase only minimally elevated on presentation.      Pancreatitis, acute, etiology unclear  History of biliary colic & cholelithiasis s/p cholecystectomy  Sudden onset epigastric pain Friday after eating pizza & 1 beer. No relief in pain since. History cholecystectomy 2013. On admission CT shows acute interstitial pancreatitis with biliary duct only mildly dilated without evidence of stones. Lipase minimally elevated (80), but diagnosis of pancreatitis made based on pain & imaging findings. EKG & troponin normal. Last EGD 2021 showed duodenitis. Does not drink significant amounts ETOH, not on any medications suspicious for causing pancreatitis. Etiology unclear. ER spoke with surgery, plan to just watch and treat as pancreatitis for now.  Differential if not improving includes post-cholecystectomy syndrome, sphincter of oddi dysfunction and may warrant MRCP or GI consult.   -Lipid panel unremarkable  -Lr 200/hr  -hydromorphone 0.3 - 0.5mg IV Q3hrs PRN for pain  -LFTs on admission minimally elevated; in AM of 10/11 improved slightly  -CBC in AM  -Initially NPO except meds & ice chips  -Start clear liquids on 10/12  -Continue pain control overnight     Elevated blood pressure without diagnosis of hypertension  In ER 's, DBP 90's. Likely secondary to pain. Otherwise asymptomatic. Monitor.      Rheumatoid arthritis  Stable, has prednisone for flares, has not required recently. No acute interventions.      Clinically Significant Risk Factors Present on Admission         # Obesity: Estimated body mass index is 31.93 kg/m  as calculated from the following:    Height as of this encounter: 1.727 m (5' 8\").    Weight as of " this encounter: 95.3 kg (210 lb).     Diet:  NPO  DVT Prophylaxis: Ambulate every shift  Nunes Catheter: Not present  Code Status:   full code  Lines: PIV    Disposition: Expected discharge in 1-3 days once pain controlled and tolerating diet.    Ángel Shirley MD    Interval History   The patient complains of ongoing abdominal pain.  Patient states worsened with ice chips, but willing to try clear liquids.    -Data reviewed today: I reviewed all new labs and imaging results over the last 24 hours. I personally reviewed no images or EKG's today.    Physical Exam   Temp: 98.1  F (36.7  C) Temp src: Oral BP: 137/79 Pulse: 80   Resp: 18 SpO2: 95 % O2 Device: None (Room air)    Vitals:    10/10/22 0938   Weight: 95.3 kg (210 lb)     Vital Signs with Ranges  Temp:  [98.1  F (36.7  C)-100.1  F (37.8  C)] 98.1  F (36.7  C)  Pulse:  [] 80  Resp:  [17-18] 18  BP: (127-150)/(64-79) 137/79  SpO2:  [90 %-95 %] 95 %  I/O last 3 completed shifts:  In: 4626.67 [P.O.:250; I.V.:4376.67]  Out: -     Gen: Well nourished, well developed, alert and oriented x 3, appears mildly uncomforable  HEENT: Atraumatic, normocephalic; sclera non-injected, anicterric; oral mucosa moist, no lesion, no exudate  Lungs: Clear to ausculation, no wheezes, no rhonchi, no rales  Heart: Regular rate, regular rhythm, no gallops, no rubs, no murmurs  GI: Bowel sound normal, no hepatosplenomegaly, no masses, non-tender, non-distended, no guarding, no rebound tenderness  Lymph: No lymphadenopathy, no edema  Skin: No rashes, no chronic venous stasis     Medications     lactated ringers 1,000 mL (10/12/22 0932)         Data   Recent Labs   Lab 10/12/22  0509 10/11/22  0446 10/10/22  0954   WBC 11.1* 14.3* 19.2*   HGB 11.6* 11.9 14.8   MCV 91 93 91    199 240    135* 133*   POTASSIUM 3.8 3.9 4.0   CHLORIDE 99 99 96*   CO2 22 21* 21*   BUN 6.1 9.0 9.9   CR 0.68 0.67 0.77   ANIONGAP 16* 15 16*   NORRIS 8.4* 8.3* 9.1   GLC 66* 77 95   ALBUMIN  --   3.0* 3.9   PROTTOTAL  --  5.6* 7.5   BILITOTAL  --  0.5 0.7   ALKPHOS  --  102 112*   ALT  --  32 49*   AST  --  22 27   LIPASE  --   --  77*       No results found for this or any previous visit (from the past 24 hour(s)).

## 2022-10-12 NOTE — PLAN OF CARE
Goal Outcome Evaluation:      Plan of Care Reviewed With: patient    Overall Patient Progress: improvingOverall Patient Progress: improving    Neuro: A&Ox4.   Cardiac: Vss, afebrile   Respiratory: Sating mid 90's on RA.  GI/: Adequate urine output. No bm since Sunday.  Diet/appetite: Tolerating npo diet.   Activity:  independent, up to chair and in halls.  Pain: At acceptable level on current regimen.   Skin: No new deficits noted.  LDA's: piv infusing ivf, new piv placed today    Plan: See previous Md notification note. Continue with POC. Notify primary team with changes.

## 2022-10-13 PROCEDURE — 258N000003 HC RX IP 258 OP 636

## 2022-10-13 PROCEDURE — 250N000011 HC RX IP 250 OP 636

## 2022-10-13 PROCEDURE — 99232 SBSQ HOSP IP/OBS MODERATE 35: CPT | Performed by: INTERNAL MEDICINE

## 2022-10-13 PROCEDURE — 120N000001 HC R&B MED SURG/OB

## 2022-10-13 RX ADMIN — HYDROMORPHONE HYDROCHLORIDE 0.5 MG: 1 INJECTION, SOLUTION INTRAMUSCULAR; INTRAVENOUS; SUBCUTANEOUS at 22:39

## 2022-10-13 RX ADMIN — HYDROMORPHONE HYDROCHLORIDE 0.5 MG: 1 INJECTION, SOLUTION INTRAMUSCULAR; INTRAVENOUS; SUBCUTANEOUS at 06:05

## 2022-10-13 RX ADMIN — HYDROMORPHONE HYDROCHLORIDE 0.5 MG: 1 INJECTION, SOLUTION INTRAMUSCULAR; INTRAVENOUS; SUBCUTANEOUS at 19:34

## 2022-10-13 RX ADMIN — HYDROMORPHONE HYDROCHLORIDE 0.5 MG: 1 INJECTION, SOLUTION INTRAMUSCULAR; INTRAVENOUS; SUBCUTANEOUS at 16:51

## 2022-10-13 RX ADMIN — SODIUM CHLORIDE, POTASSIUM CHLORIDE, SODIUM LACTATE AND CALCIUM CHLORIDE 1000 ML: 600; 310; 30; 20 INJECTION, SOLUTION INTRAVENOUS at 11:48

## 2022-10-13 RX ADMIN — HYDROMORPHONE HYDROCHLORIDE 0.5 MG: 1 INJECTION, SOLUTION INTRAMUSCULAR; INTRAVENOUS; SUBCUTANEOUS at 03:14

## 2022-10-13 RX ADMIN — HYDROMORPHONE HYDROCHLORIDE 0.5 MG: 1 INJECTION, SOLUTION INTRAMUSCULAR; INTRAVENOUS; SUBCUTANEOUS at 00:22

## 2022-10-13 RX ADMIN — SODIUM CHLORIDE, POTASSIUM CHLORIDE, SODIUM LACTATE AND CALCIUM CHLORIDE 1000 ML: 600; 310; 30; 20 INJECTION, SOLUTION INTRAVENOUS at 22:39

## 2022-10-13 RX ADMIN — HYDROMORPHONE HYDROCHLORIDE 0.5 MG: 1 INJECTION, SOLUTION INTRAMUSCULAR; INTRAVENOUS; SUBCUTANEOUS at 14:22

## 2022-10-13 RX ADMIN — HYDROCORTISONE: 1 CREAM TOPICAL at 10:13

## 2022-10-13 RX ADMIN — SODIUM CHLORIDE, POTASSIUM CHLORIDE, SODIUM LACTATE AND CALCIUM CHLORIDE 1000 ML: 600; 310; 30; 20 INJECTION, SOLUTION INTRAVENOUS at 06:05

## 2022-10-13 RX ADMIN — SODIUM CHLORIDE, POTASSIUM CHLORIDE, SODIUM LACTATE AND CALCIUM CHLORIDE 1000 ML: 600; 310; 30; 20 INJECTION, SOLUTION INTRAVENOUS at 01:03

## 2022-10-13 RX ADMIN — HYDROCORTISONE: 1 CREAM TOPICAL at 19:34

## 2022-10-13 RX ADMIN — HYDROMORPHONE HYDROCHLORIDE 0.5 MG: 1 INJECTION, SOLUTION INTRAMUSCULAR; INTRAVENOUS; SUBCUTANEOUS at 10:04

## 2022-10-13 ASSESSMENT — ACTIVITIES OF DAILY LIVING (ADL)
ADLS_ACUITY_SCORE: 20

## 2022-10-13 NOTE — PLAN OF CARE
"Patient continues to have abdominal pain; rates at \"4\" on 0-10 pain scale.  Medicated with dilaudid 0.5 mg IV as needed.  Patient has gone longer between pain medication doses today.  For breakfast she drank 8 oz broth; lunch she had bites of full liquids and sips of liquid.  Reported \"a little nausea\" after broth this morning; but declined an anti-emetic.    Goal Outcome Evaluation:      Plan of Care Reviewed With: patient                 "

## 2022-10-13 NOTE — PROGRESS NOTES
"Time: Assumed care of patient from 6310-1059      Reason for Admission: Pancreatitis       Activity: Independent in room      Neuro: alert and oriented, able to make needs known, uses call light appropriately      GI/:  continent of bladder and bowel      Skin: slight rash on thighs bilaterally, hydrocortisone cream applied      Diet: clear liquids, stated they made her nauseated so she stuck to ice chips on shift      Lines/Drains: PIV patent, infusing LR at 200/hr, states that when flushing and giving IV dilaudid it was burning at site, Dilaudid was given further up in IV tubing and the burning subsided      Vitals: BP (!) 165/77 (BP Location: Right arm, Patient Position: Sitting, Cuff Size: Adult Regular)   Pulse 86   Temp 98.7  F (37.1  C) (Oral)   Resp 16   Ht 1.702 m (5' 7\")   Wt 95.3 kg (210 lb)   LMP 07/16/2015   SpO2 94%   BMI 32.89 kg/m        Pain: Pin in mid abdomen, PRN dilaudid given and there is some relief felt, usually requesting every 2.5-3 hours      Plan: Staying here for 1-2 more days.    Denice Dimas RN on 10/13/2022 at 6:19 AM    "

## 2022-10-13 NOTE — PROGRESS NOTES
[facility]    Hospitalist Progress Note    Date of Service (when I saw the patient): 10/13/2022    Assessment & Plan   Mariano Dey is a 51 year old female who presents on 10/10/2022 with abdominal pain. Physical exam and imaging diagnostic for pancreatitis, though lipase only minimally elevated on presentation.      Pancreatitis, acute, etiology unclear  History of biliary colic & cholelithiasis s/p cholecystectomy  Sudden onset epigastric pain Friday after eating pizza & 1 beer. No relief in pain since. History cholecystectomy 2013. On admission CT shows acute interstitial pancreatitis with biliary duct only mildly dilated without evidence of stones. Lipase minimally elevated (80), but diagnosis of pancreatitis made based on pain & imaging findings. EKG & troponin normal. Last EGD 2021 showed duodenitis. Does not drink significant amounts ETOH, not on any medications suspicious for causing pancreatitis. Etiology unclear. ER spoke with surgery, plan to just watch and treat as pancreatitis for now.  Differential if not improving includes post-cholecystectomy syndrome, sphincter of oddi dysfunction and may warrant MRCP or GI consult.   -Lipid panel unremarkable  -Lr 200/hr  -hydromorphone 0.3 - 0.5mg IV Q3hrs PRN for pain  -LFTs on admission minimally elevated; in AM of 10/11 improved slightly  -CBC in AM  -Initially NPO except meds & ice chips  -Tolerated clear liquids on 10/12 with only mild pain  -Advance diet as tolerated, low fat diet     Elevated blood pressure without diagnosis of hypertension  In ER 's, DBP 90's. Likely secondary to pain. Otherwise asymptomatic. Monitor.      Rheumatoid arthritis  Stable, has prednisone for flares, has not required recently. No acute interventions.      Clinically Significant Risk Factors Present on Admission         # Obesity: Estimated body mass index is 31.93 kg/m  as calculated from the following:    Height as of this encounter:  "1.727 m (5' 8\").    Weight as of this encounter: 95.3 kg (210 lb).     Diet:  NPO  DVT Prophylaxis: Ambulate every shift  Nunes Catheter: Not present  Code Status:   full code  Lines: PIV    Disposition: Expected discharge in 1-3 days once pain controlled and tolerating diet.    Ángel Shirley MD    Interval History   The patient states she had mild epigastric pain with clear liquids, was able to tolerate only small amounts at a time.  Denies fevers, chills, vomiting, or diarrhea.    -Data reviewed today: I reviewed all new labs and imaging results over the last 24 hours. I personally reviewed no images or EKG's today.    Physical Exam   Temp: 97.8  F (36.6  C) Temp src: Oral BP: 138/74 Pulse: 82   Resp: 16 SpO2: 95 % O2 Device: None (Room air)    Vitals:    10/10/22 0938   Weight: 95.3 kg (210 lb)     Vital Signs with Ranges  Temp:  [97.8  F (36.6  C)-99.3  F (37.4  C)] 97.8  F (36.6  C)  Pulse:  [80-93] 82  Resp:  [16-18] 16  BP: (131-165)/(74-81) 138/74  SpO2:  [91 %-95 %] 95 %  I/O last 3 completed shifts:  In: 4591.67 [P.O.:240; I.V.:4351.67]  Out: -     Gen: Well nourished, well developed, alert and oriented x 3, no acute distress  HEENT: Atraumatic, normocephalic; sclera non-injected, anicterric; oral mucosa moist, no lesion, no exudate  Lungs: Clear to ausculation, no wheezes, no rhonchi, no rales  Heart: Regular rate, regular rhythm, no gallops, no rubs, no murmurs  GI: Bowel sound normal, no hepatosplenomegaly, no masses, non-tender, non-distended, no guarding, no rebound tenderness  Lymph: No lymphadenopathy, no edema  Skin: No rashes, no chronic venous stasis     Medications     lactated ringers 1,000 mL (10/13/22 0605)       hydrocortisone   Topical BID       Data   Recent Labs   Lab 10/12/22  9670 10/11/22  0446 10/10/22  0954   WBC 11.1* 14.3* 19.2*   HGB 11.6* 11.9 14.8   MCV 91 93 91    199 240    135* 133*   POTASSIUM 3.8 3.9 4.0   CHLORIDE 99 99 96*   CO2 22 21* 21*   BUN 6.1 9.0 " 9.9   CR 0.68 0.67 0.77   ANIONGAP 16* 15 16*   NORRIS 8.4* 8.3* 9.1   GLC 66* 77 95   ALBUMIN  --  3.0* 3.9   PROTTOTAL  --  5.6* 7.5   BILITOTAL  --  0.5 0.7   ALKPHOS  --  102 112*   ALT  --  32 49*   AST  --  22 27   LIPASE  --   --  77*       No results found for this or any previous visit (from the past 24 hour(s)).

## 2022-10-14 VITALS
SYSTOLIC BLOOD PRESSURE: 158 MMHG | HEIGHT: 67 IN | DIASTOLIC BLOOD PRESSURE: 90 MMHG | HEART RATE: 83 BPM | TEMPERATURE: 98 F | OXYGEN SATURATION: 95 % | RESPIRATION RATE: 18 BRPM | WEIGHT: 210 LBS | BODY MASS INDEX: 32.96 KG/M2

## 2022-10-14 PROCEDURE — 99238 HOSP IP/OBS DSCHRG MGMT 30/<: CPT | Performed by: FAMILY MEDICINE

## 2022-10-14 PROCEDURE — 250N000011 HC RX IP 250 OP 636

## 2022-10-14 PROCEDURE — 258N000003 HC RX IP 258 OP 636: Performed by: FAMILY MEDICINE

## 2022-10-14 PROCEDURE — 250N000013 HC RX MED GY IP 250 OP 250 PS 637: Performed by: FAMILY MEDICINE

## 2022-10-14 RX ORDER — SODIUM CHLORIDE, SODIUM LACTATE, POTASSIUM CHLORIDE, CALCIUM CHLORIDE 600; 310; 30; 20 MG/100ML; MG/100ML; MG/100ML; MG/100ML
1000 INJECTION, SOLUTION INTRAVENOUS CONTINUOUS
Status: DISCONTINUED | OUTPATIENT
Start: 2022-10-14 | End: 2022-10-14 | Stop reason: HOSPADM

## 2022-10-14 RX ORDER — HYDROXYZINE HYDROCHLORIDE 50 MG/1
50 TABLET, FILM COATED ORAL EVERY 6 HOURS PRN
Status: DISCONTINUED | OUTPATIENT
Start: 2022-10-14 | End: 2022-10-14 | Stop reason: HOSPADM

## 2022-10-14 RX ORDER — HYDROXYZINE HYDROCHLORIDE 25 MG/1
25 TABLET, FILM COATED ORAL EVERY 6 HOURS PRN
Status: DISCONTINUED | OUTPATIENT
Start: 2022-10-14 | End: 2022-10-14 | Stop reason: HOSPADM

## 2022-10-14 RX ORDER — ACETAMINOPHEN 325 MG/1
650 TABLET ORAL EVERY 6 HOURS
Status: DISCONTINUED | OUTPATIENT
Start: 2022-10-14 | End: 2022-10-14 | Stop reason: HOSPADM

## 2022-10-14 RX ORDER — IBUPROFEN 400 MG/1
400 TABLET, FILM COATED ORAL EVERY 6 HOURS PRN
Status: DISCONTINUED | OUTPATIENT
Start: 2022-10-14 | End: 2022-10-14 | Stop reason: HOSPADM

## 2022-10-14 RX ADMIN — HYDROMORPHONE HYDROCHLORIDE 0.5 MG: 1 INJECTION, SOLUTION INTRAMUSCULAR; INTRAVENOUS; SUBCUTANEOUS at 03:21

## 2022-10-14 RX ADMIN — HYDROCORTISONE: 1 CREAM TOPICAL at 07:59

## 2022-10-14 RX ADMIN — HYDROMORPHONE HYDROCHLORIDE 0.5 MG: 1 INJECTION, SOLUTION INTRAMUSCULAR; INTRAVENOUS; SUBCUTANEOUS at 07:57

## 2022-10-14 RX ADMIN — SODIUM CHLORIDE, POTASSIUM CHLORIDE, SODIUM LACTATE AND CALCIUM CHLORIDE 1000 ML: 600; 310; 30; 20 INJECTION, SOLUTION INTRAVENOUS at 09:31

## 2022-10-14 RX ADMIN — ACETAMINOPHEN 650 MG: 325 TABLET, FILM COATED ORAL at 09:34

## 2022-10-14 ASSESSMENT — ACTIVITIES OF DAILY LIVING (ADL)
ADLS_ACUITY_SCORE: 20
DEPENDENT_IADLS:: INDEPENDENT
ADLS_ACUITY_SCORE: 20

## 2022-10-14 NOTE — DISCHARGE SUMMARY
[facility]  Hospitalist Discharge Summary      Date of Admission:  10/10/2022  Date of Discharge:  10/14/2022  Discharging Provider: Silvia Baxter MD  Discharge Service: Hospitalist Service    Discharge Diagnoses   Principal Problem:    Pancreatitis  Active Problems:    RA (rheumatoid arthritis) (H)    S/P cholecystectomy    Acute pancreatitis, unspecified complication status, unspecified pancreatitis type    Encounter for screening laboratory testing for severe acute respiratory syndrome coronavirus 2 (SARS-CoV-2)    Follow-ups Needed After Discharge   Follow-up Appointments     Follow-up and recommended labs and tests       Follow up with primary care provider, Shilpa Guerra, within 7 days for   hospital follow- up.  No follow up labs or test are needed.             Discharge Disposition   Discharged to home  Condition at discharge: Good    Hospital Course   Mariano Dey is a 51 year old female who presents on 10/10/2022 with abdominal pain. Physical exam and imaging diagnostic for pancreatitis, though lipase only minimally elevated on presentation.      Pancreatitis, acute, etiology unclear  History of biliary colic & cholelithiasis s/p cholecystectomy but cholelithiasis ruled out  Sudden onset epigastric pain Friday after eating pizza & 1 beer. No relief in pain since. History cholecystectomy 2013. On admission CT shows acute interstitial pancreatitis with biliary duct only mildly dilated without evidence of stones. Lipase minimally elevated (80), but diagnosis of pancreatitis made based on pain & imaging findings. EKG & troponin normal. Last EGD 2021 showed duodenitis. Does not drink significant amounts ETOH, not on any medications suspicious for causing pancreatitis. Etiology unclear. ER spoke with surgery, plan to just watch and treat as pancreatitis for now.  Differential if not improving includes post-cholecystectomy syndrome, sphincter of oddi dysfunction and may warrant  MRCP or GI consult.   - fluid resuscitation and pain management  - LFTs improved  -Tolerated clear liquids on 10/12 with only mild pain  -Advance diet as tolerated, low fat diet  - patient was well enough and tolerating orals that on 10/14 was comfortable to go home. Slowly advancing diet and return precautions reviewed     Elevated blood pressure without diagnosis of hypertension  In ER 's, DBP 90's. Likely secondary to pain. Otherwise asymptomatic. Monitor.      Rheumatoid arthritis  Stable, has prednisone for flares, has not required recently. No acute interventions.        Class 1 Obesity: Estimated body mass index is 31.93 kg/m    Contributes to complexity of care, morbidity, mortality, and complexity of care here in the hospital.    Consultations This Hospital Stay   CARE MANAGEMENT / SOCIAL WORK IP CONSULT  NUTRITION SERVICES ADULT IP CONSULT    Code Status   Full Code    Time Spent on this Encounter   I, Silvia Baxter MD, personally saw the patient today and spent less than or equal to 30 minutes discharging this patient.       Silvia Baxter MD  [facility] SURG[facility]  [address] [address]  Phone: [phone]  Fax: [phone]  ______________________________________________________________________    Physical Exam   Vital Signs: Temp: 98  F (36.7  C) Temp src: Oral BP: (!) 158/90 Pulse: 83   Resp: 18 SpO2: 95 % O2 Device: None (Room air)    Weight: 210 lbs 0 oz  Constitutional: awake, alert, cooperative, no apparent distress, and appears stated age  Eyes: Lids and lashes normal, pupils equal, round and reactive to light, extra ocular muscles intact, sclera clear, conjunctiva normal  ENT: Normocephalic, without obvious abnormality, atraumatic  Respiratory: No increased work of breathing, good air exchange, clear to auscultation bilaterally, no crackles or wheezing  Cardiovascular: Normal apical impulse, regular rate and rhythm, normal S1 and S2, no  murmurs  GI: Normal bowel sounds, soft, non-distended, non-tender  Skin: no bruising or bleeding, normal skin color, texture, turgor and no rashes  Musculoskeletal: There is no redness, warmth, or swelling of the joints. Tone is normal.  Neuro/psych: Awake, alert, oriented, calm and normal eye contact         Primary Care Physician   Shilpa Guerra    Discharge Orders      Reason for your hospital stay    Acute pancreatitis     Follow-up and recommended labs and tests     Follow up with primary care provider, Shilpa Guerra, within 7 days for hospital follow- up.  No follow up labs or test are needed.     Activity    Your activity upon discharge: activity as tolerated     Diet    Follow this diet upon discharge: Orders Placed This Encounter      Low Saturated Fat Na <2400 mg      Avoid alcohol      Advance diet as tolerated       Significant Results and Procedures   Most Recent 3 CBC's:Recent Labs   Lab Test 10/12/22  0509 10/11/22  0446 10/10/22  0954   WBC 11.1* 14.3* 19.2*   HGB 11.6* 11.9 14.8   MCV 91 93 91    199 240     Most Recent 3 BMP's:Recent Labs   Lab Test 10/12/22  0509 10/11/22  0446 10/10/22  0954    135* 133*   POTASSIUM 3.8 3.9 4.0   CHLORIDE 99 99 96*   CO2 22 21* 21*   BUN 6.1 9.0 9.9   CR 0.68 0.67 0.77   ANIONGAP 16* 15 16*   NORRIS 8.4* 8.3* 9.1   GLC 66* 77 95     Most Recent 2 LFT's:Recent Labs   Lab Test 10/11/22  0446 10/10/22  0954   AST 22 27   ALT 32 49*   ALKPHOS 102 112*   BILITOTAL 0.5 0.7   ,   Results for orders placed or performed during the hospital encounter of 10/10/22   CT Abdomen Pelvis w Contrast    Narrative    CT ABDOMEN AND PELVIS WITH CONTRAST 10/10/2022 12:08 PM    CLINICAL HISTORY: Epigastric pain, systemic leukocytosis, markedly  elevated CRP.    TECHNIQUE: CT scan of the abdomen and pelvis was performed following  injection of IV contrast. Multiplanar reformats were obtained. Dose  reduction techniques were used.  CONTRAST: 100 mL  [id number]    COMPARISON: None.    FINDINGS:   LOWER CHEST: Normal.    HEPATOBILIARY: Absent gallbladder. Mild intra- and extrahepatic  biliary dilation is noted. No evidence for calcified stone or  obstructing mass in the distal common duct.    PANCREAS: Diffuse inflammatory change and edema is seen about the head  and body of the pancreas. This indicates acute interstitial  pancreatitis. There is a small amount of fluid along the inferior  aspect of the pancreatic body, but no organized collection. No  evidence for mass or pancreatic ductal dilation. The splenic vein is  patent.    SPLEEN: Normal.    ADRENAL GLANDS: Normal.    KIDNEYS/BLADDER: Small cysts are seen in the left kidney. No follow-up  necessary for these. No evidence of enhancing mass or hydronephrosis  on either side. The bladder is unremarkable.    BOWEL: No evidence for bowel obstruction. Normal appendix. Sigmoid  diverticulosis is noted without evidence of diverticulitis.    PELVIC ORGANS: Moderate amount of free fluid is present within the  pelvis.    ADDITIONAL FINDINGS: Small fat-containing periumbilical hernia.    MUSCULOSKELETAL: Degenerative changes are noted through the spine.      Impression    IMPRESSION:   1.  Acute interstitial pancreatitis. Small amount of fluid is seen  along the inferior aspect of the pancreatic body, but no organized  collection.  2.  Mild intra- and extrahepatic biliary dilation may be related to  the patient's age and postcholecystectomy state. No evidence of  obstructing mass or calcified stone within the common duct.  3.  Moderate amount of free fluid within the pelvis.    SERGIO LATHAM MD         SYSTEM ID:  [id number]       Discharge Medications   Discharge Medication List as of 10/14/2022 11:21 AM      STOP taking these medications       predniSONE (DELTASONE) 10 MG tablet Comments:   Reason for Stopping:             Allergies   Allergies   Allergen Reactions     Nkda [No Known Drug Allergies]

## 2022-10-14 NOTE — PROGRESS NOTES
[facility] DISCHARGE NOTE    Patient discharged to home at 11:30 AM via ambulation. Accompanied by other:self and staff. Discharge instructions reviewed with patient, opportunity offered to ask questions. Prescriptions - None ordered for discharge. All belongings sent with patient.    Steph Patton RN

## 2022-10-14 NOTE — PROGRESS NOTES
"Time: Assumed care of patient from 7083-1299       Reason for Admission: Pancreatitis        Activity: Independent in room       Neuro: alert and oriented, able to make needs known, uses call light appropriately       GI/:  continent of bladder and bowel       Skin: slight rash on thighs bilaterally and has spread to chest, hydrocortisone cream applied, says it hasn't really done much to help with the rash itself       Diet: Full liquid diet       Lines/Drains: PIV patent, infusing LR at 200/hr, no burning noted this shift with dilaudid administration or hanging new IV fluid bags       Vitals: BP (!) 165/83 (BP Location: Right arm, Patient Position: Sitting)   Pulse 78   Temp 98.4  F (36.9  C) (Oral)   Resp 18   Ht 1.702 m (5' 7\")   Wt 95.3 kg (210 lb)   LMP 07/16/2015   SpO2 96%   BMI 32.89 kg/m         Pain: Pain in mid abdomen, PRN dilaudid given and there is some relief felt, usually requesting 3+ hours, able to stretch out longer and says she may be willing to switch to oral pain relief today to help with discharge planning       Plan: Staying here for 1 more day.      Denice Dimas RN on 10/14/2022 at 5:15 AM    "

## 2022-10-14 NOTE — CONSULTS
Care Management Initial Consult    General Information  Assessment completed with: Mariano Mccarthy  Type of CM/SW Visit: Initial Assessment    Primary Care Provider verified and updated as needed: Yes   Readmission within the last 30 days:        Reason for Consult: substance use concerns  Advance Care Planning:         Communication Assessment  Patient's communication style: spoken language (English or Bilingual)    Hearing Difficulty or Deaf: no   Wear Glasses or Blind: no    Cognitive  Cognitive/Neuro/Behavioral: WDL                      Living Environment:   People in home: alone     Current living Arrangements: house      Able to return to prior arrangements: yes     Family/Social Support:  Care provided by: self  Provides care for: no one  Marital Status:   Sibling(s)            Current Resources:   Patient receiving home care services: No  Community Resources: None  Equipment currently used at home: none  Supplies currently used at home: None    Employment/Financial:  Employment Status:    MA      Financial Concerns:   NA     Lifestyle & Psychosocial Needs:  Social Determinants of Health     Tobacco Use: Not on file   Alcohol Use: Unknown     Frequency of Alcohol Consumption: Monthly or less     Average Number of Drinks: Not asked     Frequency of Binge Drinking: Not asked   Financial Resource Strain: Not on file   Food Insecurity: Not on file   Transportation Needs: Not on file   Physical Activity: Not on file   Stress: Not on file   Social Connections: Not on file   Intimate Partner Violence: Not on file   Depression: Not at risk     PHQ-2 Score: 0   Housing Stability: Not on file     Functional Status:  Prior to admission patient needed assistance:   Dependent ADLs:: Independent  Dependent IADLs:: Independent       Mental Health Status:  Mental Health Status: No Current Concerns       Chemical Dependency Status:  Chemical Dependency Status: No Current Concerns           Values/Beliefs:  Spiritual,  Cultural Beliefs, [employer] Practices, Values that affect care:             Additional Information:  CM received referral from MD to discuss SUDS resources.  SW met with pt and pt declines the need for any resources at this time.      SW encouraged pt to connect with PCP or care team should she change her mind or have future concerns.      SATN Garza

## 2022-10-15 ENCOUNTER — PATIENT OUTREACH (OUTPATIENT)
Dept: CARE COORDINATION | Facility: CLINIC | Age: 52
End: 2022-10-15

## 2022-10-15 NOTE — PROGRESS NOTES
[facility]    Background: Transitional Care Management program auto-identified and prompting a chart review by [facility] Resource Center team.    Assessment: Upon chart review, CCR Team member will cancel/close this episode of Transitional Care Management program due to reason below:    Patient declined to answer all  post hospital discharge questions with CCRC team member and disconnected call.    Plan: Transitional Care Management episode closed per reason above.      Mary Lozano MA  [facility] Care Resource [facility]    *Connected Care Resource Team does NOT follow patient ongoing. Referrals are identified based on internal discharge reports and the outreach is to ensure patient has an understanding of their discharge instructions.

## 2022-10-25 ENCOUNTER — OFFICE VISIT (OUTPATIENT)
Dept: FAMILY MEDICINE | Facility: CLINIC | Age: 52
End: 2022-10-25
Payer: COMMERCIAL

## 2022-10-25 VITALS
WEIGHT: 204.6 LBS | DIASTOLIC BLOOD PRESSURE: 80 MMHG | SYSTOLIC BLOOD PRESSURE: 120 MMHG | OXYGEN SATURATION: 98 % | HEART RATE: 88 BPM | TEMPERATURE: 97.4 F | BODY MASS INDEX: 32.04 KG/M2

## 2022-10-25 DIAGNOSIS — K85.90 ACUTE PANCREATITIS, UNSPECIFIED COMPLICATION STATUS, UNSPECIFIED PANCREATITIS TYPE: Primary | ICD-10-CM

## 2022-10-25 DIAGNOSIS — F10.11 HISTORY OF ETOH ABUSE: ICD-10-CM

## 2022-10-25 PROCEDURE — 99214 OFFICE O/P EST MOD 30 MIN: CPT | Performed by: FAMILY MEDICINE

## 2022-10-25 RX ORDER — ACETAMINOPHEN 500 MG
500-1000 TABLET ORAL EVERY 6 HOURS PRN
COMMUNITY
End: 2023-05-12

## 2022-10-25 NOTE — PROGRESS NOTES
A/P:      ICD-10-CM    1. Acute pancreatitis, unspecified complication status, unspecified pancreatitis type  K85.90       2. History of ETOH abuse  F10.11         Admitted with pancreatitis.  Still having some upper abdominal discomfort but improving.  Able to eat but sticking with bland foods.  Feels things are very gradually improving.  Reviewed expected course of continued gradual improvement.  If pain worsens or fails to resolve completley over the next 2 weeks she should reach out for follow up.    Has a h/o significant alcohol use.  Was drinking regularly until abd pain started.  Has now been avoiding EtOH completely.  Feels this is going well.  Thinking this episode will help her to avoid drinking in the future.  Has quit in the past for periods of time.  Declines referral to addiction medicine and is not considering support groups at this time.      Pt declines all vaccinations today      Ho Quintana is a 51 year old, presenting for the following health issues:  Hospital F/U      [facility]       Hospital Follow-up Visit:    Hospital/Nursing Home/IP Rehab Facility: [facility]  Date of Admission: 10/10/22  Date of Discharge: 10/14/22  Reason(s) for Admission: pancreatitis    Was your hospitalization related to COVID-19? No   Problems taking medications regularly:  None  Medication changes since discharge: None  Problems adhering to non-medication therapy:  None    Summary of hospitalization:  [facility] discharge summary reviewed  Diagnostic Tests/Treatments reviewed.  Follow up needed: none  Other Healthcare Providers Involved in Patient s Care:         None  Update since discharge: improved.         Plan of care communicated with patient             Review of Systems   Constitutional, HEENT, cardiovascular, pulmonary, gi and gu systems are negative, except as otherwise noted.      Objective    /80   Pulse 88   Temp 97.4  F (36.3  C) (Tympanic)   Wt  92.8 kg (204 lb 9.6 oz)   LMP 07/16/2015   SpO2 98%   BMI 32.04 kg/m    Body mass index is 32.04 kg/m .  Physical Exam   PE:  VS as above   Gen:  WN/WD/WH female in NAD   Heart:  RRR without murmur, nl S1, S2, no rubs or gallops   Lungs CTA efe without rales/ronchi/wheezes   Abd: soft, postive bowel sounds, ND, no HSM, no rebounding/guarding/ridigity, mild epigastric tenderness on palpation      Epic reviewed

## 2022-10-25 NOTE — NURSING NOTE
"Initial /80   Pulse 88   Temp 97.4  F (36.3  C) (Tympanic)   Wt 92.8 kg (204 lb 9.6 oz)   LMP 07/16/2015   SpO2 98%   BMI 32.04 kg/m   Estimated body mass index is 32.04 kg/m  as calculated from the following:    Height as of 10/10/22: 1.702 m (5' 7\").    Weight as of this encounter: 92.8 kg (204 lb 9.6 oz). .      "

## 2023-03-01 ENCOUNTER — ALLIED HEALTH/NURSE VISIT (OUTPATIENT)
Dept: FAMILY MEDICINE | Facility: CLINIC | Age: 53
End: 2023-03-01
Payer: COMMERCIAL

## 2023-03-01 DIAGNOSIS — Z23 NEED FOR VACCINATION: Primary | ICD-10-CM

## 2023-03-01 PROCEDURE — 90750 HZV VACC RECOMBINANT IM: CPT

## 2023-03-01 PROCEDURE — 90471 IMMUNIZATION ADMIN: CPT

## 2023-03-01 NOTE — PROGRESS NOTES
Prior to immunization administration, verified patients identity using patient s name and date of birth. Please see Immunization Activity for additional information.     Screening Questionnaire for Adult Immunization    Are you sick today?   No   Do you have allergies to medications, food, a vaccine component or latex?   No   Have you ever had a serious reaction after receiving a vaccination?   No   Do you have a long-term health problem with heart, lung, kidney, or metabolic disease (e.g., diabetes), asthma, a blood disorder, no spleen, complement component deficiency, a cochlear implant, or a spinal fluid leak?  Are you on long-term aspirin therapy?   No   Do you have cancer, leukemia, HIV/AIDS, or any other immune system problem?   No   Do you have a parent, brother, or sister with an immune system problem?   No   In the past 3 months, have you taken medications that affect  your immune system, such as prednisone, other steroids, or anticancer drugs; drugs for the treatment of rheumatoid arthritis, Crohn s disease, or psoriasis; or have you had radiation treatments?   No   Have you had a seizure, or a brain or other nervous system problem?   No   During the past year, have you received a transfusion of blood or blood    products, or been given immune (gamma) globulin or antiviral drug?   No   For women: Are you pregnant or is there a chance you could become       pregnant during the next month?   No   Have you received any vaccinations in the past 4 weeks?   No     Immunization questionnaire answers were all negative.        Per orders of Dr. Lorraine Shah, injection of Shingrix given by Sushila Velazco. Patient instructed to remain in clinic for 15 minutes afterwards, and to report any adverse reaction to me immediately.       Screening performed by Sushila Velazco on 3/1/2023 at 11:11 AM.

## 2023-03-29 ENCOUNTER — MYC MEDICAL ADVICE (OUTPATIENT)
Dept: FAMILY MEDICINE | Facility: CLINIC | Age: 53
End: 2023-03-29
Payer: COMMERCIAL

## 2023-03-29 DIAGNOSIS — Z12.11 SPECIAL SCREENING FOR MALIGNANT NEOPLASMS, COLON: Primary | ICD-10-CM

## 2023-03-30 ENCOUNTER — LAB (OUTPATIENT)
Dept: FAMILY MEDICINE | Facility: CLINIC | Age: 53
End: 2023-03-30
Payer: COMMERCIAL

## 2023-03-30 DIAGNOSIS — Z12.11 SPECIAL SCREENING FOR MALIGNANT NEOPLASMS, COLON: ICD-10-CM

## 2023-04-13 LAB — NONINV COLON CA DNA+OCC BLD SCRN STL QL: NEGATIVE

## 2023-04-23 ENCOUNTER — HEALTH MAINTENANCE LETTER (OUTPATIENT)
Age: 53
End: 2023-04-23

## 2023-05-12 ENCOUNTER — OFFICE VISIT (OUTPATIENT)
Dept: FAMILY MEDICINE | Facility: CLINIC | Age: 53
End: 2023-05-12
Payer: COMMERCIAL

## 2023-05-12 VITALS
HEART RATE: 63 BPM | RESPIRATION RATE: 12 BRPM | SYSTOLIC BLOOD PRESSURE: 112 MMHG | OXYGEN SATURATION: 99 % | DIASTOLIC BLOOD PRESSURE: 60 MMHG | WEIGHT: 166.4 LBS | TEMPERATURE: 97.1 F | BODY MASS INDEX: 26.12 KG/M2 | HEIGHT: 67 IN

## 2023-05-12 DIAGNOSIS — Z87.19 H/O ACUTE PANCREATITIS: ICD-10-CM

## 2023-05-12 DIAGNOSIS — F10.11 H/O ETOH ABUSE: ICD-10-CM

## 2023-05-12 DIAGNOSIS — Z00.00 ROUTINE GENERAL MEDICAL EXAMINATION AT A HEALTH CARE FACILITY: Primary | ICD-10-CM

## 2023-05-12 DIAGNOSIS — Z12.83 SCREENING FOR SKIN CANCER: ICD-10-CM

## 2023-05-12 DIAGNOSIS — Z87.891 PERSONAL HISTORY OF TOBACCO USE: ICD-10-CM

## 2023-05-12 DIAGNOSIS — R42 VERTIGO: ICD-10-CM

## 2023-05-12 DIAGNOSIS — M06.9 RHEUMATOID ARTHRITIS INVOLVING MULTIPLE SITES, UNSPECIFIED WHETHER RHEUMATOID FACTOR PRESENT (H): ICD-10-CM

## 2023-05-12 DIAGNOSIS — Z11.59 NEED FOR HEPATITIS C SCREENING TEST: ICD-10-CM

## 2023-05-12 PROBLEM — K85.90 ACUTE PANCREATITIS, UNSPECIFIED COMPLICATION STATUS, UNSPECIFIED PANCREATITIS TYPE: Status: RESOLVED | Noted: 2022-10-11 | Resolved: 2023-05-12

## 2023-05-12 PROCEDURE — 99396 PREV VISIT EST AGE 40-64: CPT | Performed by: FAMILY MEDICINE

## 2023-05-12 PROCEDURE — G0296 VISIT TO DETERM LDCT ELIG: HCPCS | Performed by: FAMILY MEDICINE

## 2023-05-12 PROCEDURE — 99213 OFFICE O/P EST LOW 20 MIN: CPT | Mod: 25 | Performed by: FAMILY MEDICINE

## 2023-05-12 PROCEDURE — 36415 COLL VENOUS BLD VENIPUNCTURE: CPT | Performed by: FAMILY MEDICINE

## 2023-05-12 PROCEDURE — 86803 HEPATITIS C AB TEST: CPT | Performed by: FAMILY MEDICINE

## 2023-05-12 RX ORDER — MULTIVITAMIN
TABLET ORAL DAILY
COMMUNITY

## 2023-05-12 ASSESSMENT — ENCOUNTER SYMPTOMS
CONSTIPATION: 0
CHILLS: 0
BREAST MASS: 1
HEARTBURN: 0
SHORTNESS OF BREATH: 0
HEMATOCHEZIA: 0
FREQUENCY: 0
SORE THROAT: 0
FEVER: 0
MYALGIAS: 0
PARESTHESIAS: 0
PALPITATIONS: 0
JOINT SWELLING: 1
COUGH: 0
WEAKNESS: 0
ARTHRALGIAS: 1
NERVOUS/ANXIOUS: 1
NAUSEA: 0
ABDOMINAL PAIN: 0
DYSURIA: 0
HEMATURIA: 0
DIZZINESS: 1
EYE PAIN: 0
HEADACHES: 0
DIARRHEA: 0

## 2023-05-12 NOTE — PATIENT INSTRUCTIONS
Preventive Health Recommendations  Female Ages 50 - 64    Yearly exam: See your health care provider every year in order to  o Review health changes.   o Discuss preventive care.    o Review your medicines if your doctor has prescribed any.      Get a Pap test every three years (unless you have an abnormal result and your provider advises testing more often).    If you get Pap tests with HPV test, you only need to test every 5 years, unless you have an abnormal result.     You do not need a Pap test if your uterus was removed (hysterectomy) and you have not had cancer.    You should be tested each year for STDs (sexually transmitted diseases) if you're at risk.     Have a mammogram every 1 to 2 years.    Have a colonoscopy at age 50, or have a yearly FIT test (stool test). These exams screen for colon cancer.      Have a cholesterol test every 5 years, or more often if advised.    Have a diabetes test (fasting glucose) every three years. If you are at risk for diabetes, you should have this test more often.     If you are at risk for osteoporosis (brittle bone disease), think about having a bone density scan (DEXA).    Shots: Get a flu shot each year. Get a tetanus shot every 10 years.    Nutrition:     Eat at least 5 servings of fruits and vegetables each day.    Eat whole-grain bread, whole-wheat pasta and brown rice instead of white grains and rice.    Get adequate Calcium and Vitamin D.     Lifestyle    Exercise at least 150 minutes a week (30 minutes a day, 5 days a week). This will help you control your weight and prevent disease.    Limit alcohol to one drink per day.    No smoking.     Wear sunscreen to prevent skin cancer.     See your dentist every six months for an exam and cleaning.    See your eye doctor every 1 to 2 years.    Lung Cancer Screening   Frequently Asked Questions  If you are at high-risk for lung cancer, getting screened with low-dose computed tomography (LDCT) every year can help save  your life. This handout offers answers to some of the most common questions about lung cancer screening. If you have other questions, please call [phone] ([phone]).     What is it?  Lung cancer screening uses special X-ray technology to create an image of your lung tissue. The exam is quick and easy and takes less than 10 seconds. We don t give you any medicine or use any needles. You can eat before and after the exam. You don t need to change your clothes as long as the clothing on your chest doesn t contain metal. But, you do need to be able to hold your breath for at least 6 seconds during the exam.    What is the goal of lung cancer screening?  The goal of lung cancer screening is to save lives. Many times, lung cancer is not found until a person starts having physical symptoms. Lung cancer screening can help detect lung cancer in the earliest stages when it may be easier to treat.    Who should be screened for lung cancer?  We suggest lung cancer screening for anyone who is at high-risk for lung cancer. You are in the high-risk group if you:      are between the ages of 55 and 79, and    have smoked at least 1 pack of cigarettes a day for 20 or more years, and    still smoke or have quit within the past 15 years.    However, if you have a new cough or shortness of breath, you should talk to your doctor before being screened.    Why does it matter if I have symptoms?  Certain symptoms can be a sign that you have a condition in your lungs that should be checked and treated by your doctor. These symptoms include fever, chest pain, a new or changing cough, shortness of breath that you have never felt before, coughing up blood or unexplained weight loss. Having any of these symptoms can greatly affect the results of lung cancer screening.       Should all smokers get an LDCT lung cancer screening exam?  It depends. Lung cancer screening is for a very specific group of men and women who have a  history of heavy smoking over a long period of time (see  Who should be screened for lung cancer  above).  I am in the high-risk group, but have been diagnosed with cancer in the past. Is LDCT lung cancer screening right for me?  In some cases, you should not have LDCT lung screening, such as when your doctor is already following your cancer with CT scan studies. Your doctor will help you decide if LDCT lung screening is right for you.  Do I need to have a screening exam every year?  Yes. If you are in the high-risk group described earlier, you should get an LDCT lung cancer screening exam every year until you are 79, or are no longer willing or able to undergo screening and possible procedures to diagnose and treat lung cancer.  How effective is LDCT at preventing death from lung cancer?  Studies have shown that LDCT lung cancer screening can lower the risk of death from lung cancer by 20 percent in people who are at high-risk.  What are the risks?  There are some risks and limitations of LDCT lung cancer screening. We want to make sure you understand the risks and benefits, so please let us know if you have any questions. Your doctor may want to talk with you more about these risks.    Radiation exposure: As with any exam that uses radiation, there is a very small increased risk of cancer. The amount of radiation in LDCT is small--about the same amount a person would get from a mammogram. Your doctor orders the exam when he or she feels the potential benefits outweigh the risks.    False negatives: No test is perfect, including LDCT. It is possible that you may have a medical condition, including lung cancer, that is not found during your exam. This is called a false negative result.    False positives and more testing: LDCT very often finds something in the lung that could be cancer, but in fact is not. This is called a false positive result. False positive tests often cause anxiety. To make sure these findings  are not cancer, you may need to have more tests. These tests will be done only if you give us permission. Sometimes patients need a treatment that can have side effects, such as a biopsy. For more information on false positives, see  What can I expect from the results?     Findings not related to lung cancer: Your LDCT exam also takes pictures of areas of your body next to your lungs. In a very small number of cases, the CT scan will show an abnormal finding in one of these areas, such as your kidneys, adrenal glands, liver or thyroid. This finding may not be serious, but you may need more tests. Your doctor can help you decide what other tests you may need, if any.  What can I expect from the results?  About 1 out of 4 LDCT exams will find something that may need more tests. Most of the time, these findings are lung nodules. Lung nodules are very small collections of tissue in the lung. These nodules are very common, and the vast majority--more than 97 percent--are not cancer (benign). Most are normal lymph nodes or small areas of scarring from past infections.  But, if a small lung nodule is found to be cancer, the cancer can be cured more than 90 percent of the time. To know if the nodule is cancer, we may need to get more images before your next yearly screening exam. If the nodule has suspicious features (for example, it is large, has an odd shape or grows over time), we will refer you to a specialist for further testing.  Will my doctor also get the results?  Yes. Your doctor will get a copy of your results.  Is it okay to keep smoking now that there s a cancer screening exam?  No. Tobacco is one of the strongest cancer-causing agents. It causes not only lung cancer, but other cancers and cardiovascular (heart) diseases as well. The damage caused by smoking builds over time. This means that the longer you smoke, the higher your risk of disease. While it is never too late to quit, the sooner you quit, the  better.  Where can I find help to quit smoking?  The best way to prevent lung cancer is to stop smoking. If you have already quit smoking, congratulations and keep it up! For help on quitting smoking, please call [facility] at 8-942-QUITNOW (1-495.116.9598) or the [employer] at 1-888.865.3321 to find local resources near you.  One-on-one health coaching:  If you d prefer to work individually with a health care provider on tobacco cessation, we offer:      Medication Therapy Management:  Our specially trained pharmacists work closely with you and your doctor to help you quit smoking.  Call [phone] or [phone] (toll free).

## 2023-05-12 NOTE — PROGRESS NOTES
"   SUBJECTIVE:   CC: Mariano is an 52 year old who presents for preventive health visit.        View : No data to display.              Patient has been advised of split billing requirements and indicates understanding: Yes  Healthy Habits:     Getting at least 3 servings of Calcium per day:  Yes    Bi-annual eye exam:  Yes    Dental care twice a year:  Yes    Sleep apnea or symptoms of sleep apnea:  None    Diet:  Low fat/cholesterol and Other    Frequency of exercise:  None    Taking medications regularly:  Not Applicable    Medication side effects:  None    PHQ-2 Total Score: 2    Additional concerns today:  Yes    Concerns:  * lump right axilla.  Noticed a few weeks ago.  Not tender or changing.    *  Was able to quit EtOH after her pancreatitis episode last fall.  Has been feeling more depressed in the last few months.  Feels like every couple of weeks she has a few mornings where she is not excited about getting up.  Not really interested in treatment but just \"powering through\".  Feels like she just needs to learn how to live without alcohol    *  Wondering about her weight loss.  Generally about 0691-0579 alba per day.  Limiting fat.  Eating lots of protein and some carbs.    Feels she gets \"pancreatic pain\" if she eats too much in 1 sitting.  Hard to get more calories when limiting fat intake. Would be interestedi in dietician referral     *  Not treating RA currently, joints better with weight loss.  Will f/u with rheum if needed.  Previously established.    * for 3 years has periodic vertigo.  Notes mostly with rapid head movement such as turning her head or bending over.  Sensation of vertigo is fleeting.  Can go a number of weeks with no symptoms and then happen multiple times per day for a few days.          Today's PHQ-2 Score:       5/12/2023     8:06 AM   PHQ-2 ( 1999 [employer])   Q1: Little interest or pleasure in doing things 1   Q2: Feeling down, depressed or hopeless 1   PHQ-2 Score 2   Q1: Little " interest or pleasure in doing things Several days    Several days   Q2: Feeling down, depressed or hopeless Several days    Several days   PHQ-2 Score 2    2           Social History     Tobacco Use     Smoking status: Every Day     Packs/day: 1.00     Years: 30.00     Pack years: 30.00     Types: Cigarettes     Smokeless tobacco: Never   Vaping Use     Vaping status: Never Used   Substance Use Topics     Alcohol use: Yes     Comment: 6 beers a week              5/12/2023     8:06 AM   Alcohol Use   Prescreen: >3 drinks/day or >7 drinks/week? Not Applicable     Reviewed orders with patient.  Reviewed health maintenance and updated orders accordingly - Yes      Breast Cancer Screening:    FHS-7:       5/12/2023     8:07 AM   Breast CA Risk Assessment (FHS-7)   Did any of your first-degree relatives have breast or ovarian cancer? Yes   Did any of your relatives have bilateral breast cancer? Unknown   Did any man in your family have breast cancer? No   Did any woman in your family have breast and ovarian cancer? Yes   Did any woman in your family have breast cancer before age 50 y? No   Do you have 2 or more relatives with breast and/or ovarian cancer? No   Do you have 2 or more relatives with breast and/or bowel cancer? No       Mammogram Screening: Recommended annual mammography  Pertinent mammograms are reviewed under the imaging tab.    History of abnormal Pap smear: NO - age 30-65 PAP every 5 years with negative HPV co-testing recommended      Latest Ref Rng & Units 3/26/2021    11:00 AM 3/26/2021    10:51 AM 7/24/2015     9:27 AM   PAP / HPV   PAP (Historical)   NIL      HPV 16 DNA NEG^Negative Negative    Negative     HPV 18 DNA NEG^Negative Negative    Negative     Other HR HPV NEG^Negative Negative    Negative       Reviewed and updated as needed this visit by clinical staff   Tobacco  Allergies  Meds              Reviewed and updated as needed this visit by Provider    Allergies  Meds             Past  "Medical History:   Diagnosis Date     HPV (human papillomavirus)      Migraines      Pancreatitis      RA (rheumatoid arthritis) (H)       Past Surgical History:   Procedure Laterality Date     CHOLECYSTECTOMY  6/13/13     ESOPHAGOSCOPY, GASTROSCOPY, DUODENOSCOPY (EGD), COMBINED N/A 2/19/2021    Procedure: ESOPHAGOGASTRODUODENOSCOPY, WITH BIOPSY;  Surgeon: Bobby Duffy MD;  Location: [facility]     LAPAROSCOPIC CHOLECYSTECTOMY  6/13/2013    Procedure: LAPAROSCOPIC CHOLECYSTECTOMY;  Laparoscopic Cholecystetomy;  Surgeon: Joshua Quiñonez MD;  Location: [facility]       Review of Systems   Constitutional: Negative for chills and fever.   HENT: Negative for congestion, ear pain, hearing loss and sore throat.    Eyes: Negative for pain and visual disturbance.   Respiratory: Negative for cough and shortness of breath.    Cardiovascular: Negative for chest pain, palpitations and peripheral edema.   Gastrointestinal: Negative for abdominal pain, constipation, diarrhea, heartburn, hematochezia and nausea.   Breasts:  Positive for breast mass. Negative for tenderness and discharge.   Genitourinary: Negative for dysuria, frequency, genital sores, hematuria, pelvic pain, urgency, vaginal bleeding and vaginal discharge.   Musculoskeletal: Positive for arthralgias and joint swelling. Negative for myalgias.   Skin: Negative for rash.   Neurological: Positive for dizziness. Negative for weakness, headaches and paresthesias.   Psychiatric/Behavioral: Positive for mood changes. The patient is nervous/anxious.           OBJECTIVE:   /60   Pulse 63   Temp 97.1  F (36.2  C) (Tympanic)   Resp 12   Ht 1.702 m (5' 7\")   Wt 75.5 kg (166 lb 6.4 oz)   LMP 07/16/2015   SpO2 99%   BMI 26.06 kg/m    Physical Exam  GENERAL: healthy, alert and no distress  EYES: Eyes grossly normal to inspection, PERRL and conjunctivae and sclerae normal  NECK: no adenopathy, no asymmetry, masses, or scars and thyroid normal to palpation  RESP: lungs clear " to auscultation - no rales, rhonchi or wheezes  BREAST: normal without masses, tenderness or nipple discharge and no palpable axillary masses or adenopathy.  Very superficial BB sized lump in the skin of the R axilla, reassurance given  CV: regular rate and rhythm, normal S1 S2, no S3 or S4, no murmur, click or rub, no peripheral edema and peripheral pulses strong  ABDOMEN: soft, nontender, no hepatosplenomegaly, no masses and bowel sounds normal  MS: no gross musculoskeletal defects noted, no edema  NEURO: Normal strength and tone, mentation intact and speech normal  PSYCH: mentation appears normal, affect normal/bright    Diagnostic Test Results:  Labs reviewed in Epic    ASSESSMENT/PLAN:       ICD-10-CM    1. Routine general medical examination at a health care facility  Z00.00       2. Vertigo  R42 Physical Therapy Referral      3. H/O acute pancreatitis  Z87.19 Nutrition Referral      4. H/O ETOH abuse  F10.11       5. Rheumatoid arthritis involving multiple sites, unspecified whether rheumatoid factor present (H)  M06.9       6. Personal history of tobacco use  Z87.891 Prof fee: Shared Decision Making for Lung Cancer Screening     CT Chest Lung Cancer Scrn Low Dose wo      7. Screening for skin cancer  Z12.83 Adult Dermatology Referral      8. Need for hepatitis C screening test  Z11.59 Hepatitis C Screen Reflex to HCV RNA Quant and Genotype        Vertigo:  Sounds positional.  Reviewed etiology, vestibular rehab referral placed.    H/o pancreatitis:  Reviewed diet and calorie intake.  recommend dietician for advice.  Consider GI if intermittent pain remains an issue    H/o EtOH:  Reviewed sober support groups.  Will seek treatment for mood if interested    RA:  Stable, will reach out to remain established with rheum    Declines all vaccinations      Patient has been advised of split billing requirements and indicates understanding: Yes      COUNSELING:  Reviewed preventive health counseling, as reflected in  "patient instructions      BMI:   Estimated body mass index is 26.06 kg/m  as calculated from the following:    Height as of this encounter: 1.702 m (5' 7\").    Weight as of this encounter: 75.5 kg (166 lb 6.4 oz).   Weight management plan: Discussed healthy diet and exercise guidelines      She reports that she has been smoking cigarettes. She has a 30.00 pack-year smoking history. She has never used smokeless tobacco.  Nicotine/Tobacco Cessation Plan:   Information offered: Patient not interested at this time          Shilpa Guerra [facility]  Lung Cancer Screening Shared Decision Making Visit     Mariano Dey, a 52 year old female, is eligible for lung cancer screening    History   Smoking Status     Every Day     Packs/day: 1.00     Years: 30.00     Types: Cigarettes   Smokeless Tobacco     Never       I have discussed with patient the risks and benefits of screening for lung cancer with low-dose CT.     The risks include:    radiation exposure: one low dose chest CT has as much ionizing radiation as about 15 chest x-rays, or 6 months of background radiation living in [place]      false positives: most findings/nodules are NOT cancer, but some might still require additional diagnostic evaluation, including biopsy    over-diagnosis: some slow growing cancers that might never have been clinically significant will be detected and treated unnecessarily     The benefit of early detection of lung cancer is contingent upon adherence to annual screening or more frequent follow up if indicated.     Furthermore, to benefit from screening, Mariano must be willing and able to undergo diagnostic procedures, if indicated. Although no specific guide is available for determining severity of comorbidities, it is reasonable to withhold screening in patients who have greater mortality risk from other diseases.     We did discuss that the best way to prevent lung cancer is to not smoke.    Some " patients may value a numeric estimation of lung cancer risk when evaluating if lung cancer screening is right for them, here is one calculator:    ShouldIScreen

## 2023-05-15 LAB — HCV AB SERPL QL IA: NONREACTIVE

## 2023-05-31 ENCOUNTER — HOSPITAL ENCOUNTER (OUTPATIENT)
Dept: CT IMAGING | Facility: CLINIC | Age: 53
Discharge: HOME | End: 2023-05-31
Attending: FAMILY MEDICINE | Admitting: FAMILY MEDICINE
Payer: COMMERCIAL

## 2023-05-31 DIAGNOSIS — Z87.891 PERSONAL HISTORY OF TOBACCO USE: ICD-10-CM

## 2023-05-31 PROCEDURE — 71271 CT THORAX LUNG CANCER SCR C-: CPT

## 2023-06-07 ENCOUNTER — THERAPY VISIT (OUTPATIENT)
Dept: PHYSICAL THERAPY | Facility: CLINIC | Age: 53
End: 2023-06-07
Attending: FAMILY MEDICINE
Payer: COMMERCIAL

## 2023-06-07 DIAGNOSIS — R42 VERTIGO: ICD-10-CM

## 2023-06-07 PROCEDURE — 97162 PT EVAL MOD COMPLEX 30 MIN: CPT | Mod: GP | Performed by: PHYSICAL THERAPIST

## 2023-06-07 PROCEDURE — 97112 NEUROMUSCULAR REEDUCATION: CPT | Mod: GP | Performed by: PHYSICAL THERAPIST

## 2023-06-07 NOTE — PROGRESS NOTES
PHYSICAL THERAPY EVALUATION  Type of Visit: Evaluation    See electronic medical record for Abuse and Falls Screening details.    Subjective     Sx off and on 4 years. Off balance, touching wall, lasting 10 minutes. HAppens 3-5x/week, last episode a week ago. Sx looking up, bending over, turn head too quick. Denies room spinning, doesn 't feel like she is moving. Doesn't like visual stuff , cars going by, passing semi, will not do it. PMH Migraines 3-4x/year.   Presenting condition or subjective complaint:    Date of onset: 05/12/23 (MD appt date)    Relevant medical history:     Dates & types of surgery:      Prior diagnostic imaging/testing results:     see epic  Prior therapy history for the same diagnosis, illness or injury:          Employment:         Patient goals for therapy:  figure out what is wrong, pts goal to feel safe driving.    Pain assessment:      Objective     OBSERVATION:   INTEGUMENTARY:   POSTURE: Standing Posture: Rounded shoulders, Forward head  PALPATION:   RANGE OF MOTION:  CROM min loss ROT B  STRENGTH:   GAIT: gait is normal, gait with horiz and vertical head turns normal, pivot slow and cautious      BALANCE: standind balance FT EO and FT EC with horiz andvertical head turns x10, moving sensation with all, horiz worse than vertical    SPECIAL TESTS    Modified CTSIB Conditions (sec) Cond 1: 30  Cond 2: 30  Cond 4: 30  Cond 5 : 30   Romberg  (sec)    Sharpened Romberg (sec)    30 Second Sit to Stand (reps/height)                 VESTIBULAR EVALUATION       Pertinent visual history: had laser eye surgery few years ago thinking sx related to vision and bifocals  Pertinent history of current vestibular problem: Migraines, Motion sickness  DHI:      Cervicogenic Screen    Neck ROM    Vertebral Artery Test Normal   Alar Ligament Test    Transverse Ligament Test Normal   Distraction Normal   Neck Torsion Test (head still, body rotating)    Neck Torsion Test (head and body rotating)          Oculomotor Screen    Ocular ROM    Smooth Pursuit Normal   Saccades Normal   VOR Normal   VOR Cancellation Abnormal only did 1.5 rotations, stopped due to increased motion sensation, nausea   Head Impulse Test    Convergence Testing         Infrared Goggle Exam Vestibular Suppressant in Last 24 Hours? No  Exam Completed With: Infrared goggles   Spontaneous Nystagmus Negative   Gaze Evoked Nystagmus Negative   Head Shake Horizontal Nystagmus Negative   Positional Testing Negative   Supine Head-Hanging Test     Left Right   George-Hallpike Negative Negative   Sidelying Test     HSCC Supine Roll Test Negative Negative   HSCC Forward Roll Test     [place] and Lean Test -  Sitting Erect    [place] and [place]an Test - Seated, Head Bent 60 Degrees Forward    [place] and Lean Test - Seated, Head Bent Backwards       BPPV Canal(s):   BPPV Type:     Dynamic Visual Acuity (DVA)    Static Acuity (LogMar) Line 9   Horizontal Head Movement at 1 Hz (LogMar) Line 8.3  Moving sensation increased slightly   Horizontal Head Movement at 2 Hz (LogMar)           Assessment & Plan   CLINICAL IMPRESSIONS   Medical Diagnosis: vertigo    Treatment Diagnosis: motion sensitivity, vestibular deficits   Impression/Assessment: Patient is a 52 year old female with dizziness, off balance complaints.  The following significant findings have been identified: Impaired balance, Instability and Dizziness. These impairments interfere with their ability to perform recreational activities, household chores and driving  as compared to previous level of function.     Clinical Decision Making (Complexity):   Clinical Presentation: Evolving/Changing  Clinical Presentation Rationale: based on medical and personal factors listed in PT evaluation  Clinical Decision Making (Complexity): Moderate complexity    PLAN OF CARE  Treatment Interventions:  Interventions: Manual Therapy, Neuromuscular Re-education, Therapeutic Exercise    Long Term Goals     PT Goal 1  Goal  Identifier: 1  Goal Description: pt will be able to bend over to  item w/o dizziness/off baalnce in 6wk  Target Date: 07/19/23  PT Goal 2  Goal Identifier: 2  Goal Description: pt will report feeling more confidence and less sx driving in 8wk  Target Date: 08/02/23  PT Goal 3  Goal Identifier: 3  Goal Description: pt willbe josh to perform at least 10x VOR cx trunk rotation exercise to demonstrate decrease in sx in 6wk  Target Date: 07/19/23      Frequency of Treatment: 1x every 2-3weeks  Duration of Treatment:      Recommended Referrals to Other Professionals:   Education Assessment:   Learner/Method: Patient;Listening;No Barriers to Learning    Risks and benefits of evaluation/treatment have been explained.   Patient/Family/caregiver agrees with Plan of Care.     Evaluation Time:     PT Eval, Moderate Complexity Minutes (63093): 20      Signing Clinician: Kris Hoenk, PT

## 2023-07-14 NOTE — PROGRESS NOTES
PHYSICAL THERAPY DISCHARGE  Discharge Note -Physical Therapy    NAME:  Mariano Dey  MRN:   [id number]       Pt did not follow up for therapy as recommended. No further contacts have been made. Last objective information or progress note will serve as final entry. Discharge from PT this date.      [facility]  Kris Hoenk  PT  [facility]  [address] [place] [place] [address]  [email or web address]   Office: [phone]  Voicemail: [phone]

## 2023-08-15 ENCOUNTER — PATIENT OUTREACH (OUTPATIENT)
Dept: CARE COORDINATION | Facility: CLINIC | Age: 53
End: 2023-08-15
Payer: COMMERCIAL

## 2023-09-12 ENCOUNTER — PATIENT OUTREACH (OUTPATIENT)
Dept: CARE COORDINATION | Facility: CLINIC | Age: 53
End: 2023-09-12
Payer: COMMERCIAL

## 2023-09-27 ENCOUNTER — OFFICE VISIT (OUTPATIENT)
Dept: DERMATOLOGY | Facility: CLINIC | Age: 53
End: 2023-09-27
Attending: FAMILY MEDICINE
Payer: COMMERCIAL

## 2023-09-27 DIAGNOSIS — D18.01 ANGIOMA OF SKIN: ICD-10-CM

## 2023-09-27 DIAGNOSIS — D48.5 NEOPLASM OF UNCERTAIN BEHAVIOR OF SKIN: ICD-10-CM

## 2023-09-27 DIAGNOSIS — D23.9 DERMAL NEVUS: ICD-10-CM

## 2023-09-27 DIAGNOSIS — D22.9 MULTIPLE BENIGN NEVI: Primary | ICD-10-CM

## 2023-09-27 DIAGNOSIS — L82.1 SEBORRHEIC KERATOSES: ICD-10-CM

## 2023-09-27 DIAGNOSIS — L81.4 LENTIGO: ICD-10-CM

## 2023-09-27 DIAGNOSIS — Z12.83 SCREENING FOR SKIN CANCER: ICD-10-CM

## 2023-09-27 PROCEDURE — 11102 TANGNTL BX SKIN SINGLE LES: CPT | Performed by: DERMATOLOGY

## 2023-09-27 PROCEDURE — 88305 TISSUE EXAM BY PATHOLOGIST: CPT | Performed by: DERMATOLOGY

## 2023-09-27 PROCEDURE — 99243 OFF/OP CNSLTJ NEW/EST LOW 30: CPT | Mod: 25 | Performed by: DERMATOLOGY

## 2023-09-27 NOTE — LETTER
9/27/2023         RE: Mariano Dey  [address] [place] [address]  [facility] [address]        Dear Colleague,    Thank you for referring your patient, Mariano Dey, to the [facility]. Please see a copy of my visit note below.    Mariano Dey , a 52 year old year old female patient, I was asked to see by Dr. Guerra for moles on skin.  Patient has no other skin complaints today.  Remainder of the HPI, Meds, PMH, Allergies, FH, and SH was reviewed in chart.      Past Medical History:   Diagnosis Date     HPV (human papillomavirus)      Migraines      Pancreatitis      RA (rheumatoid arthritis) (H)        Past Surgical History:   Procedure Laterality Date     CHOLECYSTECTOMY  6/13/13     ESOPHAGOSCOPY, GASTROSCOPY, DUODENOSCOPY (EGD), COMBINED N/A 2/19/2021    Procedure: ESOPHAGOGASTRODUODENOSCOPY, WITH BIOPSY;  Surgeon: Bobby Duffy MD;  Location: [facility]     LAPAROSCOPIC CHOLECYSTECTOMY  6/13/2013    Procedure: LAPAROSCOPIC CHOLECYSTECTOMY;  Laparoscopic Cholecystetomy;  Surgeon: Joshua Quiñonez MD;  Location: [facility]        Family History   Problem Relation Age of Onset     Cancer Mother      Other Cancer Mother         Lung     Substance Abuse Mother         Alcohol     Cancer Father      Breast Cancer Maternal Grandmother      Cardiovascular Maternal Grandmother      C.A.D. Maternal Grandfather        Social History     Socioeconomic History     Marital status:      Spouse name: Not on file     Number of children: Not on file     Years of education: Not on file     Highest education level: Not on file   Occupational History     Not on file   Tobacco Use     Smoking status: Every Day     Packs/day: 1.00     Years: 30.00     Pack years: 30.00     Types: Cigarettes     Smokeless tobacco: Never   Vaping Use     Vaping Use: Never used   Substance and Sexual Activity     Alcohol use: Yes     Comment: 6 beers a week      Drug use: No     Sexual activity: Not Currently     Partners:  Male     Birth control/protection: None   Other Topics Concern     Parent/sibling w/ CABG, MI or angioplasty before 65F 55M? No      Service No     Blood Transfusions No     Caffeine Concern No     Occupational Exposure No     Hobby Hazards No     Sleep Concern No     Stress Concern No     Weight Concern Yes     Special Diet No     Back Care No     Exercise No     Bike Helmet Yes     Seat Belt Yes     Self-Exams Not Asked   Social History Narrative     Not on file     Social Determinants of Health     Financial Resource Strain: Not on file   Food Insecurity: Not on file   Transportation Needs: Not on file   Physical Activity: Not on file   Stress: Not on file   Social Connections: Not on file   Interpersonal Safety: Not on file   Housing Stability: Not on file       Outpatient Encounter Medications as of 9/27/2023   Medication Sig Dispense Refill     cholecalciferol (VITAMIN D3) 25 mcg (1000 units) capsule Take 1 capsule by mouth daily       Multiple vitamin TABS Take by mouth daily       No facility-administered encounter medications on file as of 9/27/2023.             Review Of Systems  Skin: As above  Eyes: negative  Ears/Nose/Throat: negative  Respiratory: No shortness of breath, dyspnea on exertion, cough, or hemoptysis  Cardiovascular: negative  Gastrointestinal: negative  Genitourinary: negative  Musculoskeletal: negative  Neurologic: negative  Psychiatric: negative  Hematologic/Lymphatic/Immunologic: negative  Endocrine: negative      O:   NAD, WDWN, Alert & Oriented, Mood & Affect wnl, Vitals stable   Here today alone   General appearance bravo ii   Vitals stable   Alert, oriented and in no acute distress      Following lymph nodes palpated: Occipital, Cervical, Supraclavicular no lad  pigmented macules on trunk and ext with regular borders and pigment networks   L upper post arm irregularly pigmented papule    Stuck on papules and brown macules on trunk and ext    Red papules on trunk   Flesh  colored papules on trunk      The remainder of the full exam was normal; the following areas were examined:  conjunctiva/lids, , neck, peripheral vascular system, abdomen, lymph nodes, digits/nails, eccrine and apocrine glands, scalp/hair, face, neck, chest, abdomen, buttocks, back, RUE, LUE, RLE, LLE       Eyes: Conjunctivae/lids:Normal     ENT: Lips, buccal mucosa, tongue: normal    MSK:Normal    Cardiovascular: peripheral edema none    Pulm: Breathing Normal    Lymph Nodes: No Head and Neck Lymphadenopathy     Neuro/Psych: Orientation:Normal; Mood/Affect:Normal      A/P:  1. Seborrheic keratosis, lentigo, angioma, dermal nevus, nevi   2. L post upper arm r/o atypical nevus  TANGENTIAL BIOPSY SENT OUT:  After consent, anesthesia with LEC and prep, tangential excision performed and specimen sent out for permanent section histology.  No complications and routine wound care. Patient told to call our office in 1-2 weeks for result.       It was a pleasure speaking to Mariano Dey today.  Previous clinic  notes and pertinent laboratory tests were reviewed prior to Mariano Dey's visit.  Signs and Symptoms of skin cancer discussed with patient.  Patient encouraged to perform monthly skin exams.  UV precautions reviewed with patient.  Return to clinic pending path       Again, thank you for allowing me to participate in the care of your patient.        Sincerely,        Marcin Sol MD

## 2023-09-27 NOTE — PROGRESS NOTES
Mariano Dey , a 52 year old year old female patient, I was asked to see by Dr. Guerra for moles on skin.  Patient has no other skin complaints today.  Remainder of the HPI, Meds, PMH, Allergies, FH, and SH was reviewed in chart.      Past Medical History:   Diagnosis Date    HPV (human papillomavirus)     Migraines     Pancreatitis     RA (rheumatoid arthritis) (H)        Past Surgical History:   Procedure Laterality Date    CHOLECYSTECTOMY  6/13/13    ESOPHAGOSCOPY, GASTROSCOPY, DUODENOSCOPY (EGD), COMBINED N/A 2/19/2021    Procedure: ESOPHAGOGASTRODUODENOSCOPY, WITH BIOPSY;  Surgeon: Bobby Duffy MD;  Location: [facility]    LAPAROSCOPIC CHOLECYSTECTOMY  6/13/2013    Procedure: LAPAROSCOPIC CHOLECYSTECTOMY;  Laparoscopic Cholecystetomy;  Surgeon: Joshua Quiñnoez MD;  Location: [facility]        Family History   Problem Relation Age of Onset    Cancer Mother     Other Cancer Mother         Lung    Substance Abuse Mother         Alcohol    Cancer Father     Breast Cancer Maternal Grandmother     Cardiovascular Maternal Grandmother     C.A.D. Maternal Grandfather        Social History     Socioeconomic History    Marital status:      Spouse name: Not on file    Number of children: Not on file    Years of education: Not on file    Highest education level: Not on file   Occupational History    Not on file   Tobacco Use    Smoking status: Every Day     Packs/day: 1.00     Years: 30.00     Pack years: 30.00     Types: Cigarettes    Smokeless tobacco: Never   Vaping Use    Vaping Use: Never used   Substance and Sexual Activity    Alcohol use: Yes     Comment: 6 beers a week     Drug use: No    Sexual activity: Not Currently     Partners: Male     Birth control/protection: None   Other Topics Concern    Parent/sibling w/ CABG, MI or angioplasty before 65F 55M? No     Service No    Blood Transfusions No    Caffeine Concern No    Occupational Exposure No    Hobby Hazards No    Sleep Concern No    Stress Concern No     Weight Concern Yes    Special Diet No    Back Care No    Exercise No    Bike Helmet Yes    Seat Belt Yes    Self-Exams Not Asked   Social History Narrative    Not on file     Social Determinants of Health     Financial Resource Strain: Not on file   Food Insecurity: Not on file   Transportation Needs: Not on file   Physical Activity: Not on file   Stress: Not on file   Social Connections: Not on file   Interpersonal Safety: Not on file   Housing Stability: Not on file       Outpatient Encounter Medications as of 9/27/2023   Medication Sig Dispense Refill    cholecalciferol (VITAMIN D3) 25 mcg (1000 units) capsule Take 1 capsule by mouth daily      Multiple vitamin TABS Take by mouth daily       No facility-administered encounter medications on file as of 9/27/2023.             Review Of Systems  Skin: As above  Eyes: negative  Ears/Nose/Throat: negative  Respiratory: No shortness of breath, dyspnea on exertion, cough, or hemoptysis  Cardiovascular: negative  Gastrointestinal: negative  Genitourinary: negative  Musculoskeletal: negative  Neurologic: negative  Psychiatric: negative  Hematologic/Lymphatic/Immunologic: negative  Endocrine: negative      O:   NAD, WDWN, Alert & Oriented, Mood & Affect wnl, Vitals stable   Here today alone   General appearance bravo ii   Vitals stable   Alert, oriented and in no acute distress      Following lymph nodes palpated: Occipital, Cervical, Supraclavicular no lad  pigmented macules on trunk and ext with regular borders and pigment networks   L upper post arm irregularly pigmented papule    Stuck on papules and brown macules on trunk and ext    Red papules on trunk   Flesh colored papules on trunk      The remainder of the full exam was normal; the following areas were examined:  conjunctiva/lids, , neck, peripheral vascular system, abdomen, lymph nodes, digits/nails, eccrine and apocrine glands, scalp/hair, face, neck, chest, abdomen, buttocks, back, RUE, LUE, RLE, LLE        Eyes: Conjunctivae/lids:Normal     ENT: Lips, buccal mucosa, tongue: normal    MSK:Normal    Cardiovascular: peripheral edema none    Pulm: Breathing Normal    Lymph Nodes: No Head and Neck Lymphadenopathy     Neuro/Psych: Orientation:Normal; Mood/Affect:Normal      A/P:  1. Seborrheic keratosis, lentigo, angioma, dermal nevus, nevi   2. L post upper arm r/o atypical nevus  TANGENTIAL BIOPSY SENT OUT:  After consent, anesthesia with LEC and prep, tangential excision performed and specimen sent out for permanent section histology.  No complications and routine wound care. Patient told to call our office in 1-2 weeks for result.       It was a pleasure speaking to Mariano Dey today.  Previous clinic  notes and pertinent laboratory tests were reviewed prior to Mariano Dey's visit.  Signs and Symptoms of skin cancer discussed with patient.  Patient encouraged to perform monthly skin exams.  UV precautions reviewed with patient.  Return to clinic pending path

## 2023-09-27 NOTE — PATIENT INSTRUCTIONS
Wound Care Instructions     FOR SUPERFICIAL WOUNDS     [facility] [phone]    [facility] [phone]                       AFTER 24 HOURS YOU SHOULD REMOVE THE BANDAGE AND BEGIN DAILY DRESSING CHANGES AS FOLLOWS:     1) Remove Dressing.     2) Clean and dry the area with tap water using a Q-tip or sterile gauze pad.     3) Apply Vaseline, Aquaphor, Polysporin ointment or Bacitracin ointment over entire wound.  Do NOT use Neosporin ointment.     4) Cover the wound with a band-aid, or a sterile non-stick gauze pad and micropore paper tape      REPEAT THESE INSTRUCTIONS AT LEAST ONCE A DAY UNTIL THE WOUND HAS COMPLETELY HEALED.    It is an old wives tale that a wound heals better when it is exposed to air and allowed to dry out. The wound will heal faster with a better cosmetic result if it is kept moist with ointment and covered with a bandage.    **Do not let the wound dry out.**      Supplies Needed:      *Cotton tipped applicators (Q-tips)    *Polysporin Ointment or Bacitracin Ointment (NOT NEOSPORIN)    *Band-aids or non-stick gauze pads and micropore paper tape.      PATIENT INFORMATION:    During the healing process you will notice a number of changes. All wounds develop a small halo of redness surrounding the wound.  This means healing is occurring. Severe itching with extensive redness usually indicates sensitivity to the ointment or bandage tape used to dress the wound.  You should call our office if this develops.      Swelling  and/or discoloration around your surgical site is common, particularly when performed around the eye.    All wounds normally drain.  The larger the wound the more drainage there will be.  After 7-10 days, you will notice the wound beginning to shrink and new skin will begin to grow.  The wound is healed when you can see skin has formed over the entire area.  A healed wound has a healthy, shiny look to the surface and is red to dark pink in color  to normalize.  Wounds may take approximately 4-6 weeks to heal.  Larger wounds may take 6-8 weeks.  After the wound is healed you may discontinue dressing changes.    You may experience a sensation of tightness as your wound heals. This is normal and will gradually subside.    Your healed wound may be sensitive to temperature changes. This sensitivity improves with time, but if you re having a lot of discomfort, try to avoid temperature extremes.    Patients frequently experience itching after their wound appears to have healed because of the continue healing under the skin.  Plain Vaseline will help relieve the itching.        POSSIBLE COMPLICATIONS    BLEEDING:    Leave the bandage in place.  Use tightly rolled up gauze or a cloth to apply direct pressure over the bandage for 30  minutes.  Reapply pressure for an additional 30 minutes if necessary  Use additional gauze and tape to maintain pressure once the bleeding has stopped.

## 2023-09-29 LAB
PATH REPORT.COMMENTS IMP SPEC: NORMAL
PATH REPORT.COMMENTS IMP SPEC: NORMAL
PATH REPORT.FINAL DX SPEC: NORMAL
PATH REPORT.GROSS SPEC: NORMAL
PATH REPORT.MICROSCOPIC SPEC OTHER STN: NORMAL
PATH REPORT.RELEVANT HX SPEC: NORMAL

## 2023-12-03 ENCOUNTER — HEALTH MAINTENANCE LETTER (OUTPATIENT)
Age: 53
End: 2023-12-03

## 2024-04-12 ENCOUNTER — PATIENT OUTREACH (OUTPATIENT)
Dept: CARE COORDINATION | Facility: CLINIC | Age: 54
End: 2024-04-12
Payer: COMMERCIAL

## 2024-04-26 ENCOUNTER — PATIENT OUTREACH (OUTPATIENT)
Dept: CARE COORDINATION | Facility: CLINIC | Age: 54
End: 2024-04-26
Payer: COMMERCIAL

## 2024-06-30 ENCOUNTER — HEALTH MAINTENANCE LETTER (OUTPATIENT)
Age: 54
End: 2024-06-30

## 2024-08-15 ENCOUNTER — PATIENT OUTREACH (OUTPATIENT)
Dept: CARE COORDINATION | Facility: CLINIC | Age: 54
End: 2024-08-15
Payer: COMMERCIAL

## 2024-10-16 ENCOUNTER — HOSPITAL ENCOUNTER (OUTPATIENT)
Dept: MAMMOGRAPHY | Facility: CLINIC | Age: 54
Discharge: HOME | End: 2024-10-16
Attending: FAMILY MEDICINE | Admitting: FAMILY MEDICINE
Payer: COMMERCIAL

## 2024-10-16 ENCOUNTER — MYC MEDICAL ADVICE (OUTPATIENT)
Dept: FAMILY MEDICINE | Facility: CLINIC | Age: 54
End: 2024-10-16
Payer: COMMERCIAL

## 2024-10-16 DIAGNOSIS — Z87.891 PERSONAL HISTORY OF TOBACCO USE: Primary | ICD-10-CM

## 2024-10-16 DIAGNOSIS — Z12.31 VISIT FOR SCREENING MAMMOGRAM: ICD-10-CM

## 2024-10-16 PROCEDURE — 77063 BREAST TOMOSYNTHESIS BI: CPT

## 2024-10-17 NOTE — PATIENT INSTRUCTIONS
Lung Cancer Screening   Frequently Asked Questions  If you are at high-risk for lung cancer, getting screened with low-dose computed tomography (LDCT) every year can help save your life. This handout offers answers to some of the most common questions about lung cancer screening. If you have other questions, please call [phone] ([phone]).     What is it?  Lung cancer screening uses special X-ray technology to create an image of your lung tissue. The exam is quick and easy and takes less than 10 seconds. We don t give you any medicine or use any needles. You can eat before and after the exam. You don t need to change your clothes as long as the clothing on your chest doesn t contain metal. But, you do need to be able to hold your breath for at least 6 seconds during the exam.    What is the goal of lung cancer screening?  The goal of lung cancer screening is to save lives. Many times, lung cancer is not found until a person starts having physical symptoms. Lung cancer screening can help detect lung cancer in the earliest stages when it may be easier to treat.    Who should be screened for lung cancer?  We suggest lung cancer screening for anyone who is at high-risk for lung cancer. You are in the high-risk group if you:      are between the ages of 55 and 79, and    have smoked at least 1 pack of cigarettes a day for 20 or more years, and    still smoke or have quit within the past 15 years.    However, if you have a new cough or shortness of breath, you should talk to your doctor before being screened.    Why does it matter if I have symptoms?  Certain symptoms can be a sign that you have a condition in your lungs that should be checked and treated by your doctor. These symptoms include fever, chest pain, a new or changing cough, shortness of breath that you have never felt before, coughing up blood or unexplained weight loss. Having any of these symptoms can greatly affect the results of lung  cancer screening.       Should all smokers get an LDCT lung cancer screening exam?  It depends. Lung cancer screening is for a very specific group of men and women who have a history of heavy smoking over a long period of time (see  Who should be screened for lung cancer  above).  I am in the high-risk group, but have been diagnosed with cancer in the past. Is LDCT lung cancer screening right for me?  In some cases, you should not have LDCT lung screening, such as when your doctor is already following your cancer with CT scan studies. Your doctor will help you decide if LDCT lung screening is right for you.  Do I need to have a screening exam every year?  Yes. If you are in the high-risk group described earlier, you should get an LDCT lung cancer screening exam every year until you are 79, or are no longer willing or able to undergo screening and possible procedures to diagnose and treat lung cancer.  How effective is LDCT at preventing death from lung cancer?  Studies have shown that LDCT lung cancer screening can lower the risk of death from lung cancer by 20 percent in people who are at high-risk.  What are the risks?  There are some risks and limitations of LDCT lung cancer screening. We want to make sure you understand the risks and benefits, so please let us know if you have any questions. Your doctor may want to talk with you more about these risks.    Radiation exposure: As with any exam that uses radiation, there is a very small increased risk of cancer. The amount of radiation in LDCT is small--about the same amount a person would get from a mammogram. Your doctor orders the exam when he or she feels the potential benefits outweigh the risks.    False negatives: No test is perfect, including LDCT. It is possible that you may have a medical condition, including lung cancer, that is not found during your exam. This is called a false negative result.    False positives and more testing: LDCT very often finds  something in the lung that could be cancer, but in fact is not. This is called a false positive result. False positive tests often cause anxiety. To make sure these findings are not cancer, you may need to have more tests. These tests will be done only if you give us permission. Sometimes patients need a treatment that can have side effects, such as a biopsy. For more information on false positives, see  What can I expect from the results?     Findings not related to lung cancer: Your LDCT exam also takes pictures of areas of your body next to your lungs. In a very small number of cases, the CT scan will show an abnormal finding in one of these areas, such as your kidneys, adrenal glands, liver or thyroid. This finding may not be serious, but you may need more tests. Your doctor can help you decide what other tests you may need, if any.  What can I expect from the results?  About 1 out of 4 LDCT exams will find something that may need more tests. Most of the time, these findings are lung nodules. Lung nodules are very small collections of tissue in the lung. These nodules are very common, and the vast majority--more than 97 percent--are not cancer (benign). Most are normal lymph nodes or small areas of scarring from past infections.  But, if a small lung nodule is found to be cancer, the cancer can be cured more than 90 percent of the time. To know if the nodule is cancer, we may need to get more images before your next yearly screening exam. If the nodule has suspicious features (for example, it is large, has an odd shape or grows over time), we will refer you to a specialist for further testing.  Will my doctor also get the results?  Yes. Your doctor will get a copy of your results.  Is it okay to keep smoking now that there s a cancer screening exam?  No. Tobacco is one of the strongest cancer-causing agents. It causes not only lung cancer, but other cancers and cardiovascular (heart) diseases as well. The damage  caused by smoking builds over time. This means that the longer you smoke, the higher your risk of disease. While it is never too late to quit, the sooner you quit, the better.  Where can I find help to quit smoking?  The best way to prevent lung cancer is to stop smoking. If you have already quit smoking, congratulations and keep it up! For help on quitting smoking, please call [facility] at 7-170-QUITNOW (1-469.303.2075) or the [employer] at 1-815.823.4784 to find local resources near you.  One-on-one health coaching:  If you d prefer to work individually with a health care provider on tobacco cessation, we offer:      Medication Therapy Management:  Our specially trained pharmacists work closely with you and your doctor to help you quit smoking.  Call [phone] or [phone] (toll free).

## 2024-10-17 NOTE — TELEPHONE ENCOUNTER
Lung Cancer Screening Shared Decision Making Visit     Mariano Dey, a 53 year old female, is eligible for lung cancer screening    History   Smoking Status     Every Day     Packs/day: 1.00     Years: 30.00     Types: Cigarettes   Smokeless Tobacco     Never       I have discussed with patient the risks and benefits of screening for lung cancer with low-dose CT.     The risks include:    radiation exposure: one low dose chest CT has as much ionizing radiation as about 15 chest x-rays, or 6 months of background radiation living in [place]      false positives: most findings/nodules are NOT cancer, but some might still require additional diagnostic evaluation, including biopsy    over-diagnosis: some slow growing cancers that might never have been clinically significant will be detected and treated unnecessarily     The benefit of early detection of lung cancer is contingent upon adherence to annual screening or more frequent follow up if indicated.     Furthermore, to benefit from screening, Mariano must be willing and able to undergo diagnostic procedures, if indicated. Although no specific guide is available for determining severity of comorbidities, it is reasonable to withhold screening in patients who have greater mortality risk from other diseases.     We did discuss that the best way to prevent lung cancer is to not smoke.    Some patients may value a numeric estimation of lung cancer risk when evaluating if lung cancer screening is right for them, here is one calculator:    ShouldIScreen

## 2024-11-13 ENCOUNTER — HOSPITAL ENCOUNTER (OUTPATIENT)
Dept: CT IMAGING | Facility: CLINIC | Age: 54
Discharge: HOME | End: 2024-11-13
Attending: FAMILY MEDICINE | Admitting: FAMILY MEDICINE
Payer: COMMERCIAL

## 2024-11-13 DIAGNOSIS — Z87.891 PERSONAL HISTORY OF TOBACCO USE: ICD-10-CM

## 2024-11-13 PROCEDURE — 71271 CT THORAX LUNG CANCER SCR C-: CPT

## 2025-07-13 ENCOUNTER — HEALTH MAINTENANCE LETTER (OUTPATIENT)
Age: 55
End: 2025-07-13

## 2025-08-07 ENCOUNTER — E-VISIT (OUTPATIENT)
Dept: URGENT CARE | Facility: CLINIC | Age: 55
End: 2025-08-07
Payer: COMMERCIAL

## 2025-08-07 DIAGNOSIS — L70.0 ACNE VULGARIS: Primary | ICD-10-CM

## 2025-08-07 RX ORDER — ADAPALENE 0.1 G/100G
CREAM TOPICAL AT BEDTIME
Qty: 45 G | Refills: 2 | Status: SHIPPED | OUTPATIENT
Start: 2025-08-07

## 2025-08-07 RX ORDER — CLINDAMYCIN PHOSPHATE 10 UG/ML
LOTION TOPICAL 2 TIMES DAILY
Qty: 60 ML | Refills: 2 | Status: SHIPPED | OUTPATIENT
Start: 2025-08-07